# Patient Record
Sex: MALE | Race: WHITE | NOT HISPANIC OR LATINO | URBAN - METROPOLITAN AREA
[De-identification: names, ages, dates, MRNs, and addresses within clinical notes are randomized per-mention and may not be internally consistent; named-entity substitution may affect disease eponyms.]

---

## 2023-08-11 ENCOUNTER — HOSPITAL ENCOUNTER (OUTPATIENT)
Dept: DATA CONVERSION | Facility: HOSPITAL | Age: 68
Discharge: HOME | End: 2023-08-11

## 2023-08-11 DIAGNOSIS — E03.9 HYPOTHYROIDISM, UNSPECIFIED: ICD-10-CM

## 2023-08-11 LAB
T4 FREE SERPL-MCNC: 1.6 NG/DL (ref 0.9–1.7)
TSH SERPL DL<=0.05 MIU/L-ACNC: 0.24 MIU/L (ref 0.27–4.2)

## 2023-09-15 ENCOUNTER — HOSPITAL ENCOUNTER (OUTPATIENT)
Dept: DATA CONVERSION | Facility: HOSPITAL | Age: 68
Discharge: HOME | End: 2023-09-15
Payer: MEDICARE

## 2023-09-15 DIAGNOSIS — E03.9 HYPOTHYROIDISM, UNSPECIFIED: ICD-10-CM

## 2023-09-15 LAB
T4 FREE SERPL-MCNC: 1.6 NG/DL (ref 0.9–1.7)
TSH SERPL DL<=0.05 MIU/L-ACNC: 0.18 MIU/L (ref 0.27–4.2)

## 2023-11-08 PROBLEM — E05.00 GRAVES DISEASE: Status: ACTIVE | Noted: 2023-11-08

## 2023-11-08 PROBLEM — I48.0 PAROXYSMAL ATRIAL FIBRILLATION (MULTI): Status: ACTIVE | Noted: 2023-11-08

## 2023-11-08 PROBLEM — I10 ESSENTIAL HYPERTENSION: Status: ACTIVE | Noted: 2023-11-08

## 2023-11-08 PROBLEM — I82.409 DEEP VEIN THROMBOSIS (DVT) (MULTI): Status: ACTIVE | Noted: 2023-11-08

## 2023-11-08 PROBLEM — E03.9 HYPOTHYROIDISM: Status: ACTIVE | Noted: 2023-11-08

## 2023-11-08 PROBLEM — I82.462 ACUTE DEEP VEIN THROMBOSIS (DVT) OF CALF MUSCLE VEIN OF LEFT LOWER EXTREMITY (MULTI): Status: ACTIVE | Noted: 2023-11-08

## 2023-11-08 PROBLEM — N39.0 URINARY TRACT INFECTIOUS DISEASE: Status: ACTIVE | Noted: 2023-11-08

## 2023-11-08 PROBLEM — I20.9 ANGINA PECTORIS (CMS-HCC): Status: ACTIVE | Noted: 2023-11-08

## 2023-11-08 PROBLEM — R00.2 PALPITATIONS: Status: ACTIVE | Noted: 2023-11-08

## 2023-11-08 PROBLEM — G47.10 HYPERSOMNIA: Status: ACTIVE | Noted: 2023-11-08

## 2023-11-08 RX ORDER — LEVOTHYROXINE SODIUM 175 UG/1
1 TABLET ORAL DAILY
COMMUNITY
End: 2023-11-09 | Stop reason: ALTCHOICE

## 2023-11-08 RX ORDER — ROSUVASTATIN CALCIUM 20 MG/1
TABLET, COATED ORAL
COMMUNITY
Start: 2023-08-11

## 2023-11-08 RX ORDER — FLECAINIDE ACETATE 150 MG/1
1 TABLET ORAL 2 TIMES DAILY
COMMUNITY
End: 2023-11-09 | Stop reason: ALTCHOICE

## 2023-11-08 RX ORDER — LEVOTHYROXINE SODIUM 150 UG/1
1 TABLET ORAL
COMMUNITY
Start: 2023-08-11 | End: 2023-11-09 | Stop reason: ALTCHOICE

## 2023-11-08 RX ORDER — LEVOTHYROXINE SODIUM 137 UG/1
1 TABLET ORAL
COMMUNITY
Start: 2023-08-11 | End: 2023-11-16 | Stop reason: SDUPTHER

## 2023-11-08 RX ORDER — LISINOPRIL 10 MG/1
1 TABLET ORAL DAILY
COMMUNITY

## 2023-11-08 RX ORDER — APIXABAN 5 MG/1
TABLET, FILM COATED ORAL
COMMUNITY

## 2023-11-09 ENCOUNTER — OFFICE VISIT (OUTPATIENT)
Dept: PRIMARY CARE | Facility: CLINIC | Age: 68
End: 2023-11-09
Payer: MEDICARE

## 2023-11-09 VITALS
BODY MASS INDEX: 35.29 KG/M2 | SYSTOLIC BLOOD PRESSURE: 132 MMHG | WEIGHT: 305 LBS | OXYGEN SATURATION: 96 % | DIASTOLIC BLOOD PRESSURE: 76 MMHG | HEART RATE: 69 BPM | HEIGHT: 78 IN

## 2023-11-09 DIAGNOSIS — Z11.59 ENCOUNTER FOR HEPATITIS C SCREENING TEST FOR LOW RISK PATIENT: ICD-10-CM

## 2023-11-09 DIAGNOSIS — I10 ESSENTIAL HYPERTENSION: ICD-10-CM

## 2023-11-09 DIAGNOSIS — E03.9 ACQUIRED HYPOTHYROIDISM: ICD-10-CM

## 2023-11-09 DIAGNOSIS — Z00.00 MEDICARE ANNUAL WELLNESS VISIT, SUBSEQUENT: Primary | ICD-10-CM

## 2023-11-09 DIAGNOSIS — R73.01 IFG (IMPAIRED FASTING GLUCOSE): ICD-10-CM

## 2023-11-09 PROBLEM — G47.10 HYPERSOMNIA: Status: RESOLVED | Noted: 2023-11-08 | Resolved: 2023-11-09

## 2023-11-09 PROBLEM — I82.462 ACUTE DEEP VEIN THROMBOSIS (DVT) OF CALF MUSCLE VEIN OF LEFT LOWER EXTREMITY (MULTI): Status: RESOLVED | Noted: 2023-11-08 | Resolved: 2023-11-09

## 2023-11-09 PROBLEM — N39.0 URINARY TRACT INFECTIOUS DISEASE: Status: RESOLVED | Noted: 2023-11-08 | Resolved: 2023-11-09

## 2023-11-09 PROBLEM — R00.2 PALPITATIONS: Status: RESOLVED | Noted: 2023-11-08 | Resolved: 2023-11-09

## 2023-11-09 PROBLEM — I82.409 DEEP VEIN THROMBOSIS (DVT) (MULTI): Status: RESOLVED | Noted: 2023-11-08 | Resolved: 2023-11-09

## 2023-11-09 PROBLEM — I20.9 ANGINA PECTORIS (CMS-HCC): Status: RESOLVED | Noted: 2023-11-08 | Resolved: 2023-11-09

## 2023-11-09 PROCEDURE — 1036F TOBACCO NON-USER: CPT

## 2023-11-09 PROCEDURE — 1159F MED LIST DOCD IN RCRD: CPT

## 2023-11-09 PROCEDURE — 3078F DIAST BP <80 MM HG: CPT

## 2023-11-09 PROCEDURE — G0439 PPPS, SUBSEQ VISIT: HCPCS

## 2023-11-09 PROCEDURE — 90677 PCV20 VACCINE IM: CPT

## 2023-11-09 PROCEDURE — 90750 HZV VACC RECOMBINANT IM: CPT

## 2023-11-09 PROCEDURE — 3075F SYST BP GE 130 - 139MM HG: CPT

## 2023-11-09 PROCEDURE — 90472 IMMUNIZATION ADMIN EACH ADD: CPT

## 2023-11-09 PROCEDURE — 90471 IMMUNIZATION ADMIN: CPT

## 2023-11-09 PROCEDURE — G0439 PPPS, SUBSEQ VISIT: HCPCS | Mod: 25

## 2023-11-09 PROCEDURE — 1126F AMNT PAIN NOTED NONE PRSNT: CPT

## 2023-11-09 ASSESSMENT — ENCOUNTER SYMPTOMS
FATIGUE: 1
EYE PAIN: 0
DYSURIA: 0
LIGHT-HEADEDNESS: 0
HEMATURIA: 0
TREMORS: 0
BLOOD IN STOOL: 0
ARTHRALGIAS: 0
DIAPHORESIS: 0
NAUSEA: 0
FEVER: 0
COUGH: 0
PALPITATIONS: 0
DIFFICULTY URINATING: 0
DIARRHEA: 0
VOMITING: 0
SHORTNESS OF BREATH: 0
WHEEZING: 0
ADENOPATHY: 0
MYALGIAS: 0
SORE THROAT: 0

## 2023-11-09 ASSESSMENT — PATIENT HEALTH QUESTIONNAIRE - PHQ9
2. FEELING DOWN, DEPRESSED OR HOPELESS: NOT AT ALL
1. LITTLE INTEREST OR PLEASURE IN DOING THINGS: NOT AT ALL
SUM OF ALL RESPONSES TO PHQ9 QUESTIONS 1 AND 2: 0

## 2023-11-09 ASSESSMENT — PAIN SCALES - GENERAL: PAINLEVEL: 0-NO PAIN

## 2023-11-09 NOTE — PROGRESS NOTES
[facility]: FAMILY MEDICINE  MEDICARE WELLNESS EXAM      Kiel Cantrell is a 67 y.o. male that is presenting today for Annual Exam (Pt is here for a physical with no concerns /la).    Subjective   hx of HTN, anticoagulation for A-fib, hypothyroidism           Other providers: Dr. Jin (Cardiologist- sees every 6m), Dr. Dr. 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  HTN/A-fib co-managed with cardiologist, cardiologist fills medications.    HTN: Stable. On 10 mg Lisinopril. No medication side effects. Home BP not checking. Only has fatigue when in Afib. Denies chest pain, heart palpitations, SOB, dizziness, headaches, changes of vision, syncope, leg swelling.      Thyroid: currently on 137 mcg of Levothyroxine, have been decreasing medications due to low TSH. Last TSH 9/15/23 0.18 with T4 of 1.6 (decreased med from 175 to 137 mcg). Due for repeat labs. Denies cold/heat intolerance, heart palpitations, tremors, diarrhea, constipation.       Review of Systems   Constitutional:  Positive for fatigue. Negative for diaphoresis and fever.   HENT:  Positive for hearing loss. Negative for congestion and sore throat.    Eyes:  Negative for pain.   Respiratory:  Negative for cough, shortness of breath and wheezing.    Cardiovascular:  Positive for leg swelling. Negative for chest pain and palpitations.   Gastrointestinal:  Negative for blood in stool, diarrhea, nausea and vomiting.   Endocrine: Negative for cold intolerance and heat intolerance.   Genitourinary:  Negative for difficulty urinating, dysuria and hematuria.   Musculoskeletal:  Negative for arthralgias and myalgias.   Skin:  Negative for rash.   Allergic/Immunologic: Negative for immunocompromised state.   Neurological:  Negative for tremors, syncope and light-headedness.   Hematological:  Negative for adenopathy.   Psychiatric/Behavioral:  Negative for suicidal ideas.      ACTIVITIES OF DAILY LIVING:  Basic ADLs:  Problems with Bathing,  Dressing, Toileting, Transferring, Continence, Feeding No  Instrumental ADLs:  Problems with Ability to use phone, Shopping, Cooking, House-keeping, Laundry, Transportation, Medication Management, Finance Management No    Advance Care Planning   Advanced Care Planning was discussed with patient:  The patient has an active advanced care plan on file. The patient has an active surrogate decision-maker on file.    History    Past Medical History:   Diagnosis Date    Acute deep vein thrombosis (DVT) of calf muscle vein of left lower extremity (CMS/[facility]) 11/08/2023    Unspecified atrial fibrillation (CMS/[facility]) 03/27/2014    Atrial fibrillation     History reviewed. No pertinent surgical history.  Family History   Problem Relation Name Age of Onset    Lung disease Father      No Known Problems Sister      No Known Problems Daughter      No Known Problems Son       No Known Allergies  Current Outpatient Medications on File Prior to Visit   Medication Sig Dispense Refill    Eliquis 5 mg tablet as directed Orally twice a day (bid)      levothyroxine (Synthroid, Levoxyl) 137 mcg tablet Take 1 tablet (137 mcg) by mouth once daily in the morning. Take before meals.      lisinopril 10 mg tablet Take 1 tablet (10 mg) by mouth once daily.      rosuvastatin (Crestor) 20 mg tablet       [DISCONTINUED] flecainide (Tambocor) 150 mg tablet Take 1 tablet (150 mg) by mouth 2 times a day.      [DISCONTINUED] levothyroxine (Synthroid, Levoxyl) 150 mcg tablet Take 1 tablet (150 mcg) by mouth once daily in the morning. Take before meals.      [DISCONTINUED] levothyroxine (Synthroid, Levoxyl) 175 mcg tablet Take 1 tablet (175 mcg) by mouth once daily.       No current facility-administered medications on file prior to visit.     Immunization History   Administered Date(s) Administered    [facility] SARS-CoV-2 Vaccination 03/09/2021     Patient's medical history was reviewed and updated either before or during this encounter.    Objective   Vitals:     23 0745   BP: 132/76   Pulse: 69   SpO2: 96%      Physical Exam  Constitutional:       General: He is not in acute distress.     Appearance: Normal appearance.   HENT:      Right Ear: Tympanic membrane and ear canal normal.      Left Ear: Tympanic membrane and ear canal normal.      Nose: Nose normal.      Mouth/Throat:      Mouth: Mucous membranes are moist.      Pharynx: Oropharynx is clear. No oropharyngeal exudate or posterior oropharyngeal erythema.   Eyes:      Extraocular Movements: Extraocular movements intact.      Conjunctiva/sclera: Conjunctivae normal.      Pupils: Pupils are unequal.   Neck:      Thyroid: No thyroid mass or thyromegaly.      Vascular: No carotid bruit.   Cardiovascular:      Rate and Rhythm: Rhythm irregular.      Pulses:           Posterior tibial pulses are 2+ on the right side and 2+ on the left side.      Heart sounds: No murmur heard.     No gallop.   Pulmonary:      Effort: Pulmonary effort is normal.      Breath sounds: No wheezing, rhonchi or rales.   Abdominal:      General: Bowel sounds are normal.      Palpations: Abdomen is soft. There is no hepatomegaly, splenomegaly or mass.      Tenderness: There is no abdominal tenderness. There is no guarding.   Musculoskeletal:         General: Normal range of motion.      Right lower le+ Pitting Edema present.      Left lower le+ Pitting Edema present.   Lymphadenopathy:      Cervical: No cervical adenopathy.   Skin:     General: Skin is warm.      Findings: No rash.   Neurological:      General: No focal deficit present.      Mental Status: He is alert.   Psychiatric:         Mood and Affect: Mood normal.         Thought Content: Thought content normal.         Assessment/Plan    Diagnoses and all orders for this visit:  Medicare annual wellness visit, subsequent  Essential hypertension  -     Comprehensive Metabolic Panel; Future  Acquired hypothyroidism  -     TSH with reflex to Free T4 if abnormal; Future  IFG  (impaired fasting glucose)  -     Hemoglobin A1c; Future  Encounter for hepatitis C screening test for low risk patient  -     Hepatitis C Antibody; Future  Other orders  -     Pneumococcal conjugate vaccine, 20-valent (PREVNAR 20)  -     Zoster vaccine, recombinant, adult (SHINGRIX)    Patient Active Problem List   Diagnosis    Essential hypertension    Graves disease    Acquired hypothyroidism    Paroxysmal atrial fibrillation (CMS/HCC)       Diagnoses and all orders for this visit:  Medicare annual wellness visit, subsequent  Essential hypertension  -     Comprehensive Metabolic Panel; Future  Acquired hypothyroidism  -     TSH with reflex to Free T4 if abnormal; Future  IFG (impaired fasting glucose)  -     Hemoglobin A1c; Future  Encounter for hepatitis C screening test for low risk patient  -     Hepatitis C Antibody; Future  Other orders  -     Pneumococcal conjugate vaccine, 20-valent (PREVNAR 20)  -     Zoster vaccine, recombinant, adult (SHINGRIX)    The patient was encouraged to ensure that any/all documentation is accurate and up to date, and that our office be provided a copy in the event that anything changes.    Louisa Andres PA-C

## 2023-11-10 ENCOUNTER — LAB (OUTPATIENT)
Dept: LAB | Facility: LAB | Age: 68
End: 2023-11-10
Payer: MEDICARE

## 2023-11-10 DIAGNOSIS — R73.01 IFG (IMPAIRED FASTING GLUCOSE): ICD-10-CM

## 2023-11-10 DIAGNOSIS — Z11.59 ENCOUNTER FOR HEPATITIS C SCREENING TEST FOR LOW RISK PATIENT: ICD-10-CM

## 2023-11-10 DIAGNOSIS — I10 ESSENTIAL HYPERTENSION: ICD-10-CM

## 2023-11-10 DIAGNOSIS — E03.9 ACQUIRED HYPOTHYROIDISM: ICD-10-CM

## 2023-11-10 LAB
ALBUMIN SERPL BCP-MCNC: 4.1 G/DL (ref 3.4–5)
ALP SERPL-CCNC: 77 U/L (ref 33–136)
ALT SERPL W P-5'-P-CCNC: 11 U/L (ref 10–52)
ANION GAP SERPL CALC-SCNC: 10 MMOL/L (ref 10–20)
AST SERPL W P-5'-P-CCNC: 12 U/L (ref 9–39)
BILIRUB SERPL-MCNC: 0.8 MG/DL (ref 0–1.2)
BUN SERPL-MCNC: 14 MG/DL (ref 6–23)
CALCIUM SERPL-MCNC: 9.6 MG/DL (ref 8.6–10.6)
CHLORIDE SERPL-SCNC: 107 MMOL/L (ref 98–107)
CO2 SERPL-SCNC: 30 MMOL/L (ref 21–32)
CREAT SERPL-MCNC: 1.1 MG/DL (ref 0.5–1.3)
EST. AVERAGE GLUCOSE BLD GHB EST-MCNC: 120 MG/DL
GFR SERPL CREATININE-BSD FRML MDRD: 74 ML/MIN/1.73M*2
GLUCOSE SERPL-MCNC: 100 MG/DL (ref 74–99)
HBA1C MFR BLD: 5.8 %
HCV AB SER QL: NONREACTIVE
POTASSIUM SERPL-SCNC: 4.4 MMOL/L (ref 3.5–5.3)
PROT SERPL-MCNC: 6.3 G/DL (ref 6.4–8.2)
SODIUM SERPL-SCNC: 143 MMOL/L (ref 136–145)
TSH SERPL-ACNC: 0.63 MIU/L (ref 0.44–3.98)

## 2023-11-10 PROCEDURE — 36415 COLL VENOUS BLD VENIPUNCTURE: CPT

## 2023-11-10 PROCEDURE — 80053 COMPREHEN METABOLIC PANEL: CPT

## 2023-11-10 PROCEDURE — 86803 HEPATITIS C AB TEST: CPT

## 2023-11-10 PROCEDURE — 84443 ASSAY THYROID STIM HORMONE: CPT

## 2023-11-10 PROCEDURE — 83036 HEMOGLOBIN GLYCOSYLATED A1C: CPT

## 2023-11-16 DIAGNOSIS — E03.9 ACQUIRED HYPOTHYROIDISM: Primary | ICD-10-CM

## 2023-11-16 RX ORDER — LEVOTHYROXINE SODIUM 137 UG/1
137 TABLET ORAL
Qty: 90 TABLET | Refills: 3 | Status: SHIPPED | OUTPATIENT
Start: 2023-11-16

## 2024-03-13 ENCOUNTER — HOSPITAL ENCOUNTER (OUTPATIENT)
Dept: CARDIOLOGY | Facility: CLINIC | Age: 69
Discharge: HOME | End: 2024-03-13
Payer: MEDICARE

## 2024-03-13 DIAGNOSIS — I35.0 NONRHEUMATIC AORTIC (VALVE) STENOSIS: ICD-10-CM

## 2024-03-13 PROCEDURE — 93306 TTE W/DOPPLER COMPLETE: CPT

## 2024-03-13 PROCEDURE — 93306 TTE W/DOPPLER COMPLETE: CPT | Performed by: INTERNAL MEDICINE

## 2024-03-13 PROCEDURE — 2500000004 HC RX 250 GENERAL PHARMACY W/ HCPCS (ALT 636 FOR OP/ED): Performed by: STUDENT IN AN ORGANIZED HEALTH CARE EDUCATION/TRAINING PROGRAM

## 2024-03-13 RX ADMIN — PERFLUTREN 2 ML OF DILUTION: 6.52 INJECTION, SUSPENSION INTRAVENOUS at 12:28

## 2024-03-15 LAB
AORTIC VALVE MEAN GRADIENT: 27.5 MMHG
AORTIC VALVE PEAK VELOCITY: 3.34 M/S
AV PEAK GRADIENT: 44.6 MMHG
AVA (PEAK VEL): 1.1 CM2
AVA (VTI): 1.15 CM2
EJECTION FRACTION APICAL 4 CHAMBER: 58.1
EJECTION FRACTION: 73 %
LEFT ATRIUM VOLUME AREA LENGTH INDEX BSA: 35.5 ML/M2
LEFT VENTRICLE INTERNAL DIMENSION DIASTOLE: 5.25 CM (ref 3.5–6)
LEFT VENTRICULAR OUTFLOW TRACT DIAMETER: 2.11 CM
RIGHT VENTRICLE FREE WALL PEAK S': 11 CM/S
RIGHT VENTRICLE PEAK SYSTOLIC PRESSURE: 28.2 MMHG
TRICUSPID ANNULAR PLANE SYSTOLIC EXCURSION: 1.9 CM

## 2024-03-29 ENCOUNTER — HOSPITAL ENCOUNTER (OUTPATIENT)
Dept: VASCULAR MEDICINE | Facility: CLINIC | Age: 69
Discharge: HOME | End: 2024-03-29
Payer: MEDICARE

## 2024-03-29 DIAGNOSIS — I10 ESSENTIAL HYPERTENSION: ICD-10-CM

## 2024-03-29 DIAGNOSIS — I87.1 COMPRESSION OF VEIN: ICD-10-CM

## 2024-03-29 DIAGNOSIS — I87.8: ICD-10-CM

## 2024-03-29 PROCEDURE — 93978 VASCULAR STUDY: CPT

## 2024-03-29 PROCEDURE — 93978 VASCULAR STUDY: CPT | Performed by: INTERNAL MEDICINE

## 2024-10-04 DIAGNOSIS — E03.9 ACQUIRED HYPOTHYROIDISM: ICD-10-CM

## 2024-10-07 RX ORDER — LEVOTHYROXINE SODIUM 137 UG/1
137 TABLET ORAL
Qty: 90 TABLET | Refills: 0 | Status: SHIPPED | OUTPATIENT
Start: 2024-10-07

## 2024-11-14 ENCOUNTER — OFFICE VISIT (OUTPATIENT)
Dept: PRIMARY CARE | Facility: CLINIC | Age: 69
End: 2024-11-14
Payer: MEDICARE

## 2024-11-14 VITALS
HEART RATE: 88 BPM | WEIGHT: 315 LBS | SYSTOLIC BLOOD PRESSURE: 132 MMHG | HEIGHT: 78 IN | BODY MASS INDEX: 36.45 KG/M2 | OXYGEN SATURATION: 99 % | DIASTOLIC BLOOD PRESSURE: 70 MMHG

## 2024-11-14 DIAGNOSIS — I48.0 PAROXYSMAL ATRIAL FIBRILLATION (MULTI): ICD-10-CM

## 2024-11-14 DIAGNOSIS — Z12.12 SCREENING FOR COLORECTAL CANCER: ICD-10-CM

## 2024-11-14 DIAGNOSIS — Z00.00 ROUTINE GENERAL MEDICAL EXAMINATION AT HEALTH CARE FACILITY: Primary | ICD-10-CM

## 2024-11-14 DIAGNOSIS — I10 ESSENTIAL HYPERTENSION: ICD-10-CM

## 2024-11-14 DIAGNOSIS — I25.10 CORONARY ARTERY DISEASE INVOLVING NATIVE CORONARY ARTERY OF NATIVE HEART WITHOUT ANGINA PECTORIS: ICD-10-CM

## 2024-11-14 DIAGNOSIS — E03.9 ACQUIRED HYPOTHYROIDISM: ICD-10-CM

## 2024-11-14 DIAGNOSIS — N52.9 ERECTILE DYSFUNCTION, UNSPECIFIED ERECTILE DYSFUNCTION TYPE: ICD-10-CM

## 2024-11-14 DIAGNOSIS — R73.03 PRE-DIABETES: ICD-10-CM

## 2024-11-14 DIAGNOSIS — Z12.11 SCREENING FOR COLORECTAL CANCER: ICD-10-CM

## 2024-11-14 DIAGNOSIS — B35.6 TINEA CRURIS: ICD-10-CM

## 2024-11-14 PROCEDURE — 1123F ACP DISCUSS/DSCN MKR DOCD: CPT

## 2024-11-14 PROCEDURE — 99214 OFFICE O/P EST MOD 30 MIN: CPT

## 2024-11-14 PROCEDURE — 1159F MED LIST DOCD IN RCRD: CPT

## 2024-11-14 PROCEDURE — 1158F ADVNC CARE PLAN TLK DOCD: CPT

## 2024-11-14 PROCEDURE — 3075F SYST BP GE 130 - 139MM HG: CPT

## 2024-11-14 PROCEDURE — 90662 IIV NO PRSV INCREASED AG IM: CPT

## 2024-11-14 PROCEDURE — 1126F AMNT PAIN NOTED NONE PRSNT: CPT

## 2024-11-14 PROCEDURE — 3008F BODY MASS INDEX DOCD: CPT

## 2024-11-14 PROCEDURE — 1036F TOBACCO NON-USER: CPT

## 2024-11-14 PROCEDURE — G0439 PPPS, SUBSEQ VISIT: HCPCS

## 2024-11-14 PROCEDURE — 99215 OFFICE O/P EST HI 40 MIN: CPT

## 2024-11-14 PROCEDURE — 3078F DIAST BP <80 MM HG: CPT

## 2024-11-14 RX ORDER — SILDENAFIL 50 MG/1
50 TABLET, FILM COATED ORAL DAILY PRN
Qty: 12 TABLET | Refills: 3 | Status: SHIPPED | OUTPATIENT
Start: 2024-11-14 | End: 2025-11-14

## 2024-11-14 RX ORDER — CLOTRIMAZOLE 1 %
CREAM (GRAM) TOPICAL 2 TIMES DAILY
Qty: 30 G | Refills: 0 | Status: SHIPPED | OUTPATIENT
Start: 2024-11-14 | End: 2025-03-14

## 2024-11-14 ASSESSMENT — PAIN SCALES - GENERAL: PAINLEVEL_OUTOF10: 0-NO PAIN

## 2024-11-14 ASSESSMENT — COGNITIVE AND FUNCTIONAL STATUS - GENERAL: VERBAL FLUENCY - ANIMAL NAMES (0 TO 25): 3

## 2024-11-14 ASSESSMENT — ENCOUNTER SYMPTOMS
ADENOPATHY: 0
LIGHT-HEADEDNESS: 0
NAUSEA: 0
DIFFICULTY URINATING: 0
ARTHRALGIAS: 1
PALPITATIONS: 0
COUGH: 0
HEMATURIA: 0
DYSURIA: 0
DIAPHORESIS: 0
SHORTNESS OF BREATH: 0
WHEEZING: 0
MYALGIAS: 0
BLOOD IN STOOL: 0
DIARRHEA: 0
FATIGUE: 0
FEVER: 0
VOMITING: 0
SORE THROAT: 0

## 2024-11-14 NOTE — PROGRESS NOTES
[facility]: FAMILY MEDICINE  MEDICARE WELLNESS EXAM      Kiel Cantrell is a 68 y.o. male that is presenting today for Annual Exam (Patient is having itching around the scrotum/dd).    Subjective   hx of HTN, CAD, anticoagulation for A-fib, hypothyroidism           Other providers: Dr. Jin (Cardiologist- sees every 6m), Dr. Dr. 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    HTN/A-fib co-managed with cardiologist, cardiologist fills medications. Recently stopped flecainide as was not working and now just on Eliquis.    HTN: Stable. On 10 mg Lisinopril. No medication side effects. Home BP not checking. Only has fatigue when in Afib. Denies chest pain, heart palpitations, SOB, dizziness, headaches, changes of vision, syncope, leg swelling.      Thyroid: controlled on 137 mcg of Levothyroxine. Due for TSH. Feeling euthyroid.     Acute concerns:  1) Scrotum pruritus waxing and waning x 3-4 months. Patient then tried changing laundry detergent and change body lotion which has not helped. Patient making sure to keep area dry. Has tried vaseline and neosporin as well.    2) Cardiologist gave him Viagra, patient states works slightly but also would like a urologist referral and refill.             Denies changes or concerns with hearing.   Review of Systems   Constitutional:  Negative for diaphoresis, fatigue and fever.   HENT:  Negative for congestion, hearing loss and sore throat.    Eyes:  Negative for visual disturbance.   Respiratory:  Negative for cough, shortness of breath and wheezing.    Cardiovascular:  Negative for chest pain, palpitations and leg swelling.   Gastrointestinal:  Negative for blood in stool, diarrhea, nausea and vomiting.   Genitourinary:  Negative for difficulty urinating, dysuria and hematuria.   Musculoskeletal:  Positive for arthralgias. Negative for myalgias.   Skin:  Positive for rash.   Allergic/Immunologic: Negative for immunocompromised state.   Neurological:   Negative for syncope and light-headedness.   Hematological:  Negative for adenopathy.   Psychiatric/Behavioral:  Negative for suicidal ideas.      ACTIVITIES OF DAILY LIVING:  Basic ADLs:  Problems with Bathing, Dressing, Toileting, Transferring, Continence, Feeding No  Instrumental ADLs:  Problems with Ability to use phone, Shopping, Cooking, House-keeping, Laundry, Transportation, Medication Management, Finance Management No    Advance Care Planning   Advanced Care Planning was discussed with patient:  The patient has an active advanced care plan on file. The patient has an active surrogate decision-maker on file.    History    Past Medical History:   Diagnosis Date    Acute deep vein thrombosis (DVT) of calf muscle vein of left lower extremity (Multi) 11/08/2023    HL (hearing loss)     Unspecified atrial fibrillation (Multi) 03/27/2014    Atrial fibrillation    Visual impairment      Past Surgical History:   Procedure Laterality Date    EYE SURGERY      VASECTOMY       Family History   Problem Relation Name Age of Onset    Early natural death Mother Nara Cantrell     Miscarriages / Stillbirths Mother Nara Cantrell     Lung disease Father Nolan Cantrell     COPD Father Nolan Cantrell     No Known Problems Sister      No Known Problems Daughter      No Known Problems Son       No Known Allergies  Current Outpatient Medications on File Prior to Visit   Medication Sig Dispense Refill    Eliquis 5 mg tablet as directed Orally twice a day (bid)      levothyroxine (Synthroid) 137 mcg tablet TAKE 1 TABLET DAILY IN THE MORNING BEFORE A MEAL 90 tablet 0    lisinopril 10 mg tablet Take 1 tablet (10 mg) by mouth once daily.      rosuvastatin (Crestor) 20 mg tablet        No current facility-administered medications on file prior to visit.     Immunization History   Administered Date(s) Administered    [facility] SARS-CoV-2 Vaccination 03/09/2021    Pneumococcal conjugate vaccine, 20-valent (PREVNAR 20) 11/09/2023    Zoster vaccine,  recombinant, adult (SHINGRIX) 11/09/2023     Patient's medical history was reviewed and updated either before or during this encounter.    Objective   Vitals:    11/14/24 0857   BP: 132/70   Pulse: 88   SpO2: 99%      Physical Exam  Constitutional:       General: He is not in acute distress.     Appearance: Normal appearance.   HENT:      Right Ear: Tympanic membrane and ear canal normal.      Left Ear: Tympanic membrane and ear canal normal.      Nose: Nose normal.      Mouth/Throat:      Mouth: Mucous membranes are moist.      Pharynx: Oropharynx is clear. No oropharyngeal exudate or posterior oropharyngeal erythema.   Eyes:      Extraocular Movements: Extraocular movements intact.      Conjunctiva/sclera: Conjunctivae normal.      Pupils: Pupils are equal, round, and reactive to light.   Neck:      Thyroid: No thyroid mass or thyromegaly.   Cardiovascular:      Rate and Rhythm: Normal rate and regular rhythm.      Pulses: Normal pulses.      Heart sounds: No murmur heard.     No gallop.   Pulmonary:      Effort: Pulmonary effort is normal.      Breath sounds: No wheezing, rhonchi or rales.   Abdominal:      General: Bowel sounds are normal.      Palpations: Abdomen is soft. There is no hepatomegaly, splenomegaly or mass.      Tenderness: There is no abdominal tenderness. There is no guarding.   Genitourinary:         Comments: Slightly erythematous rash of inner groin area  Musculoskeletal:         General: Normal range of motion.      Right lower leg: No edema.      Left lower leg: No edema.   Lymphadenopathy:      Cervical: No cervical adenopathy.   Skin:     General: Skin is warm.      Findings: Rash present.   Neurological:      General: No focal deficit present.      Mental Status: He is alert.      Motor: Motor function is intact. No weakness.   Psychiatric:         Mood and Affect: Mood normal.         Thought Content: Thought content normal.         Assessment/Plan    Diagnoses and all orders for this  visit:  Routine general medical examination at health care facility  -     1 Year Follow Up In Primary Care - Wellness Exam; Future  Essential hypertension  -     Comprehensive metabolic panel; Future  Acquired hypothyroidism  -     Tsh With Reflex To Free T4 If Abnormal; Future  Paroxysmal atrial fibrillation (Multi)  Coronary artery disease involving native coronary artery of native heart without angina pectoris  -     Lipid panel; Future  Screening for colorectal cancer  -     Cologuard®; Future  Pre-diabetes  -     Hemoglobin A1c; Future  Tinea cruris  -     clotrimazole (Lotrimin) 1 % cream; Apply topically 2 times a day. apply to affected area  Erectile dysfunction, unspecified erectile dysfunction type  -     sildenafil (Viagra) 50 mg tablet; Take 1 tablet (50 mg) by mouth once daily as needed for erectile dysfunction.  -     Referral to Urology; Future  Other orders  -     Flu vaccine, trivalent, preservative free, HIGH-DOSE, age 65y+ (Fluzone)    Given clotrimazole for groin rash. Will fill thyroid meds after labs come back. Follow-up one year or sooner prn. Get shingles shot at pharmacy.     Patient Active Problem List   Diagnosis    Essential hypertension    Graves disease    Acquired hypothyroidism    Paroxysmal atrial fibrillation (Multi)    Coronary artery disease       The patient was encouraged to ensure that any/all documentation is accurate and up to date, and that our office be provided a copy in the event that anything changes.    Louisa Andres PA-C

## 2024-11-15 ENCOUNTER — LAB (OUTPATIENT)
Dept: LAB | Facility: LAB | Age: 69
End: 2024-11-15
Payer: MEDICARE

## 2024-11-15 ENCOUNTER — TELEPHONE (OUTPATIENT)
Dept: PRIMARY CARE | Facility: CLINIC | Age: 69
End: 2024-11-15

## 2024-11-15 DIAGNOSIS — E03.9 ACQUIRED HYPOTHYROIDISM: ICD-10-CM

## 2024-11-15 DIAGNOSIS — I10 ESSENTIAL HYPERTENSION: ICD-10-CM

## 2024-11-15 DIAGNOSIS — R73.03 PRE-DIABETES: ICD-10-CM

## 2024-11-15 DIAGNOSIS — I25.10 CORONARY ARTERY DISEASE INVOLVING NATIVE CORONARY ARTERY OF NATIVE HEART WITHOUT ANGINA PECTORIS: ICD-10-CM

## 2024-11-15 LAB
ALBUMIN SERPL BCP-MCNC: 4.1 G/DL (ref 3.4–5)
ALP SERPL-CCNC: 63 U/L (ref 33–136)
ALT SERPL W P-5'-P-CCNC: 17 U/L (ref 10–52)
ANION GAP SERPL CALC-SCNC: 13 MMOL/L (ref 10–20)
AST SERPL W P-5'-P-CCNC: 14 U/L (ref 9–39)
BILIRUB SERPL-MCNC: 0.6 MG/DL (ref 0–1.2)
BUN SERPL-MCNC: 18 MG/DL (ref 6–23)
CALCIUM SERPL-MCNC: 8.7 MG/DL (ref 8.6–10.6)
CHLORIDE SERPL-SCNC: 109 MMOL/L (ref 98–107)
CHOLEST SERPL-MCNC: 83 MG/DL (ref 0–199)
CHOLESTEROL/HDL RATIO: 2.5
CO2 SERPL-SCNC: 24 MMOL/L (ref 21–32)
CREAT SERPL-MCNC: 1.08 MG/DL (ref 0.5–1.3)
EGFRCR SERPLBLD CKD-EPI 2021: 75 ML/MIN/1.73M*2
EST. AVERAGE GLUCOSE BLD GHB EST-MCNC: 134 MG/DL
GLUCOSE SERPL-MCNC: 116 MG/DL (ref 74–99)
HBA1C MFR BLD: 6.3 %
HDLC SERPL-MCNC: 33.4 MG/DL
LDLC SERPL CALC-MCNC: 31 MG/DL
NON HDL CHOLESTEROL: 50 MG/DL (ref 0–149)
POTASSIUM SERPL-SCNC: 4.3 MMOL/L (ref 3.5–5.3)
PROT SERPL-MCNC: 6.3 G/DL (ref 6.4–8.2)
SODIUM SERPL-SCNC: 142 MMOL/L (ref 136–145)
TRIGL SERPL-MCNC: 94 MG/DL (ref 0–149)
TSH SERPL-ACNC: 0.59 MIU/L (ref 0.44–3.98)
VLDL: 19 MG/DL (ref 0–40)

## 2024-11-15 PROCEDURE — 80061 LIPID PANEL: CPT

## 2024-11-15 PROCEDURE — 83036 HEMOGLOBIN GLYCOSYLATED A1C: CPT

## 2024-11-15 PROCEDURE — 80053 COMPREHEN METABOLIC PANEL: CPT

## 2024-11-15 PROCEDURE — 36415 COLL VENOUS BLD VENIPUNCTURE: CPT

## 2024-11-15 PROCEDURE — 84443 ASSAY THYROID STIM HORMONE: CPT

## 2024-11-15 RX ORDER — LEVOTHYROXINE SODIUM 137 UG/1
137 TABLET ORAL
Qty: 90 TABLET | Refills: 3 | Status: SHIPPED | OUTPATIENT
Start: 2024-11-15

## 2024-11-15 NOTE — TELEPHONE ENCOUNTER
Thyroid stable and thyroid medication refill sent. A1c at 6.3 has elevated compared to previous years, needs to really focus on healthy diet with eliminating sugars and carbohydrates to prevent progression to DM. Rest of labs normal.

## 2024-12-05 LAB — NONINV COLON CA DNA+OCC BLD SCRN STL QL: NEGATIVE

## 2024-12-06 DIAGNOSIS — B35.6 TINEA CRURIS: ICD-10-CM

## 2024-12-07 RX ORDER — CLOTRIMAZOLE 1 %
CREAM (GRAM) TOPICAL 2 TIMES DAILY
Qty: 30 G | Refills: 1 | Status: SHIPPED | OUTPATIENT
Start: 2024-12-07 | End: 2025-04-06

## 2024-12-09 ENCOUNTER — OFFICE VISIT (OUTPATIENT)
Dept: SPORTS MEDICINE | Facility: CLINIC | Age: 69
End: 2024-12-09
Payer: MEDICARE

## 2024-12-09 VITALS
HEART RATE: 72 BPM | HEIGHT: 78 IN | DIASTOLIC BLOOD PRESSURE: 74 MMHG | WEIGHT: 305 LBS | BODY MASS INDEX: 35.29 KG/M2 | SYSTOLIC BLOOD PRESSURE: 122 MMHG

## 2024-12-09 DIAGNOSIS — S99.911A RIGHT ANKLE INJURY, INITIAL ENCOUNTER: ICD-10-CM

## 2024-12-09 DIAGNOSIS — S82.51XA AVULSION FRACTURE OF MEDIAL MALLEOLUS OF RIGHT TIBIA, CLOSED, INITIAL ENCOUNTER: ICD-10-CM

## 2024-12-09 DIAGNOSIS — S82.446A: ICD-10-CM

## 2024-12-09 DIAGNOSIS — S93.491A HIGH ANKLE SPRAIN OF RIGHT LOWER EXTREMITY, INITIAL ENCOUNTER: ICD-10-CM

## 2024-12-09 DIAGNOSIS — S93.401A SPRAIN OF RIGHT ANKLE, INITIAL ENCOUNTER: ICD-10-CM

## 2024-12-09 PROCEDURE — 99214 OFFICE O/P EST MOD 30 MIN: CPT | Performed by: NURSE PRACTITIONER

## 2024-12-09 PROCEDURE — 1125F AMNT PAIN NOTED PAIN PRSNT: CPT | Performed by: NURSE PRACTITIONER

## 2024-12-09 PROCEDURE — 3008F BODY MASS INDEX DOCD: CPT | Performed by: NURSE PRACTITIONER

## 2024-12-09 PROCEDURE — 3074F SYST BP LT 130 MM HG: CPT | Performed by: NURSE PRACTITIONER

## 2024-12-09 PROCEDURE — 3078F DIAST BP <80 MM HG: CPT | Performed by: NURSE PRACTITIONER

## 2024-12-09 PROCEDURE — 1123F ACP DISCUSS/DSCN MKR DOCD: CPT | Performed by: NURSE PRACTITIONER

## 2024-12-09 PROCEDURE — 1159F MED LIST DOCD IN RCRD: CPT | Performed by: NURSE PRACTITIONER

## 2024-12-09 PROCEDURE — 99204 OFFICE O/P NEW MOD 45 MIN: CPT | Performed by: NURSE PRACTITIONER

## 2024-12-09 ASSESSMENT — PAIN - FUNCTIONAL ASSESSMENT: PAIN_FUNCTIONAL_ASSESSMENT: 0-10

## 2024-12-09 ASSESSMENT — COLUMBIA-SUICIDE SEVERITY RATING SCALE - C-SSRS
1. IN THE PAST MONTH, HAVE YOU WISHED YOU WERE DEAD OR WISHED YOU COULD GO TO SLEEP AND NOT WAKE UP?: NO
2. HAVE YOU ACTUALLY HAD ANY THOUGHTS OF KILLING YOURSELF?: NO
6. HAVE YOU EVER DONE ANYTHING, STARTED TO DO ANYTHING, OR PREPARED TO DO ANYTHING TO END YOUR LIFE?: NO

## 2024-12-09 ASSESSMENT — ENCOUNTER SYMPTOMS
OCCASIONAL FEELINGS OF UNSTEADINESS: 1
DEPRESSION: 0
LOSS OF SENSATION IN FEET: 0

## 2024-12-09 ASSESSMENT — PATIENT HEALTH QUESTIONNAIRE - PHQ9
1. LITTLE INTEREST OR PLEASURE IN DOING THINGS: NOT AT ALL
2. FEELING DOWN, DEPRESSED OR HOPELESS: NOT AT ALL
SUM OF ALL RESPONSES TO PHQ9 QUESTIONS 1 AND 2: 0

## 2024-12-09 ASSESSMENT — PAIN SCALES - GENERAL
PAINLEVEL_OUTOF10: 3
PAINLEVEL_OUTOF10: 3

## 2024-12-09 ASSESSMENT — PAIN DESCRIPTION - DESCRIPTORS: DESCRIPTORS: DULL;THROBBING

## 2024-12-09 NOTE — PATIENT INSTRUCTIONS
May use PRICE therapy as needed.  Possibility in future start into Physical Therapy 1-2 times a week for 8-10 weeks with manual therapy as well as dry needling and IASTM  Stressed the importance of wearing shoes with good stability control to help with the biomechanics affecting the lower legs  Stressed the importance of wearing full foot insoles to help with the biomechanics affecting the lower legs  Recommendation over-the-counter calcium 500mg, 3 times a day with vitamin-D3 5320-7708+ units a day with food as well as a daily multivitamin  Recommendation over-the-counter Move Free for joint health  May take OTC Tylenol Extra Strength or OTC Tylenol Arthritis, taking one every 6-8 hours with food as needed for pain management.  Patient advised regarding the risk and/or potential adverse reactions and/or side effects of any prescribed medications along with any over-the-counter medications or any supplements used. Patient advised to seek immediate medical care if any adverse reactions occur. The patient and/or patient(s) parent(s) verbalized their understanding.  Discussed in detail with the patient to the level of their understanding the possibility in the future of regenerative injections versus corticosteroids injections  MRI of RIGHT ANKLE to rule out tendon vs ligament vs tear vs fracture vs other, MSK to read.   Follow up after MRI of RIGHT ANKLE or sooner if needed.     You have been ordered an MRI/MR Arthrogram of the RIGHT ANKLE. Once you contact scheduling at [phone] and obtain the date and time of your MRI/MR Arthrogram, contact our office at [phone] to schedule your follow-up appointment to review your results.

## 2024-12-09 NOTE — PROGRESS NOTES
"New patient  History Of Present Illness  Kiel Cantrell is a 69 y.o. male presenting with RIGHT ankle pain. Patient reports he fell on ice in the driveway last Tuesday and the R ankle was \"locked in place\" and could not move, and was forced in to inversion. Patient stated his R knee hurt more than his ankle at first, but he first noticed the ankle pain when he stood on it when he got up. Patient relays he attempted to move his R ankle to relieve the pain as he had with previous ankle injuries noting it did not decrease.  Patient went to an urgent care location 12/8/24, had X-rays, and was provided a walking boot. Patient has been using crutches he had at home for a past knee surgery. Patient came into the appointment without the walking boot on because it is too painful due to swelling. Patient reports 3/10 at rest, noting it was 8/10 at the time of the injury. Patient states the pain has been gradually improving, but still present with weight bearing.  We reviewed patient x-ray results in detail.  Patient x-rays show a spiral fracture of the distal fibula as well as significant swelling.  Upon exam patient has indications of a high ankle sprain particularly over the syndesmotic ligaments as well as medial and lateral ankle sprain and marked tenderness over the talus with concern for occult fracture.  As such after discussion due to patient's physical exam and due to the nature of the injury we will order an MRI to evaluate for underlying soft tissue injury and concern for need for referral to orthopedic surgery for syndesmotic repair.  We will have patient continue with his walking boot and will be nonweightbearing in a pair of crutches.  Patient is to not drive.  We can follow-up after MRI and adjust treatment as indicated at that time.  Patient and spouse verbalized understanding and agreement with plan of care.    Past Medical History  He has a past medical history of Acute deep vein thrombosis (DVT) of calf " "muscle vein of left lower extremity (Multi) (11/08/2023), HL (hearing loss), Unspecified atrial fibrillation (Multi) (03/27/2014), and Visual impairment.    Surgical History  He has a past surgical history that includes Vasectomy and Eye surgery.     Social History  He reports that he has never smoked. He has never been exposed to tobacco smoke. He has never used smokeless tobacco. He reports current alcohol use of about 3.0 standard drinks of alcohol per week. He reports that he does not use drugs.    Family History  Family History   Problem Relation Name Age of Onset    Early natural death Mother Nara Cantrell     Miscarriages / Stillbirths Mother Nara Cantrell     Lung disease Father Nolan Cantrell     COPD Father Nolan Cantrell     No Known Problems Sister      No Known Problems Daughter      No Known Problems Son          Allergies  Patient has no known allergies.    Review of Systems  Review of Systems     Last Recorded Vitals  /74 (BP Location: Left arm, Patient Position: Sitting, BP Cuff Size: Large adult)   Pulse 72   Ht 1.981 m (6' 6\")   Wt 138 kg (305 lb)   BMI 35.25 kg/m²      The , LON FERNANDEZ,  was present during today's visit and not limited to physical examination!    Examination:  Right Ankle and Foot  Edema: Positive.   Ecchymosis/Bruising: Positive.   Percussion Test: Positive           Tuning Fork Test: Positive    Orientation:   Positive  Asymmetrical, because of RIGHT ankle pain and swelling.          ROM:   Positive, Decreased  plantarflexion and dorsiflexion due to pain            Muscle Strength: Positive   +3/+5 Planar Flexion, Decreased due to pain  +3/+5  Dorsi Flexion, Decreased due to pain     +3/+5 Inversion  +3/+5 Eversion        +3/+5 Plantarflexion in combination with inversion  +3/+5 Plantarflexion in combination with eversion          +3/+5 Dorsiflexion in combination with inversion (Posterior Tibialis)  +3/+5 Dorsiflexion in combination with eversion (Peroneal " Brevis)  +3/+5 Dorsiflexion in combination with eversion and flexion of great toe (Peroneal Longus).            Palpation:   Positive, Painful and Tender to Palpation Right medial malleolus, tibia/fibula           Vascular:   +2/+4 Dorsalis Pedis  +2/+4 Posterior Tibialis  Capillary Refill < 2 Seconds.        Leg/Ankle/Foot - Ankle Impingement:  Posterior Ankle Impingement Test: Negative.   Anterior Ankle Impingement Test: Negative.         Leg/Ankle/Foot - Ankle Instability:  Flexibility Test:  Positive.   Anterior Drawer Test:  Negative.   Talar Tilt Test:  Negative.   External Rotation Kleiger Plantar Flexion Test: Positive  External Rotation Kleiger Dorsiflexion Test:  Positive  Squeeze Test:  Positive  Dorsiflexion Test:  Negative.   Posterior Tibial Instability Test: Negative.  Peroneal Tendon Instability Test:  Negative.  Horizontal Squeeze Test:  Positive.   Vertical Squeeze Test:  Positive.   Standing/Walking Test:  Positive, Painful.         Leg/Ankle/Foot - DVT:  Maikol's Sign: Negative.         Leg/Ankle/Foot - Forefoot:  Strunsky Test:  Negative.   Gaenslen Maneuver Test: Negative.   Metatarsal Tap Test: Negative  Crepitation Test: Negative.         Leg/Ankle/Foot - Hindfoot Achilles/Calcaneus:  Salazar Compression Test: Negative.   Hoffa Sign: Negative.   Achilles Tendon Tap Test: Negative.   Heel Compression Test: Negative.         Leg/Ankle/Foot - Nerve Irritation:  Sahara Sign: Negative.   Digital Nerve Stretch Test: Negative.   Tinel Sign: Negative.   Tourniquet Sign: Negative.         Feet/Foot:   Positive BILATERAL  Valgus foot     Imaging and Diagnostics Review:  Plain radiographs ordered during today's visit and independently reviewed.  Patient has spiral fracture of the distal fibula with marked swelling  Assessment   1. Right ankle injury, initial encounter    2. Sprain of right ankle, initial encounter    3. Stress fracture of right fibula, initial encounter    4. Avulsion fracture of medial  malleolus of right tibia, closed, initial encounter      Plan   Treatment or Intervention:  May use PRICE therapy as needed.  Possibility in future start into Physical Therapy 1-2 times a week for 8-10 weeks with manual therapy as well as dry needling and IASTM  Stressed the importance of wearing shoes with good stability control to help with the biomechanics affecting the lower legs  Stressed the importance of wearing full foot insoles to help with the biomechanics affecting the lower legs  Recommendation over-the-counter calcium 500mg, 3 times a day with vitamin-D3 7906-5355+ units a day with food as well as a daily multivitamin  Recommendation over-the-counter Move Free for joint health  May take OTC Tylenol Extra Strength or OTC Tylenol Arthritis, taking one every 6-8 hours with food as needed for pain management.  Patient advised regarding the risk and/or potential adverse reactions and/or side effects of any prescribed medications along with any over-the-counter medications or any supplements used. Patient advised to seek immediate medical care if any adverse reactions occur. The patient and/or patient(s) parent(s) verbalized their understanding.  Discussed in detail with the patient to the level of their understanding the possibility in the future of regenerative injections versus corticosteroids injections  MRI of RIGHT ANKLE to rule out tendon vs ligament vs tear vs fracture vs other, MSK to read.   Follow up after MRI of RIGHT ANKLE or sooner if needed.     Diagnostic studies:      Activity Instructions, Restrictions, and Accommodations:      Consultations/Referrals:  Physical therapy    Follow-up:  Follow up after MRI  Total appointment time _45_ minutes. Greater than 50% spent counseling patient on results of physical exam, treatment options as well as results of ordered imaging and treatment for results, need for PT and expected outcomes, as well as discussing possible medications.    Deven Chu  CNP

## 2024-12-19 ENCOUNTER — HOSPITAL ENCOUNTER (OUTPATIENT)
Dept: RADIOLOGY | Facility: CLINIC | Age: 69
Discharge: HOME | End: 2024-12-19
Payer: MEDICARE

## 2024-12-19 DIAGNOSIS — S93.491A HIGH ANKLE SPRAIN OF RIGHT LOWER EXTREMITY, INITIAL ENCOUNTER: ICD-10-CM

## 2024-12-19 DIAGNOSIS — S82.446A: ICD-10-CM

## 2024-12-19 DIAGNOSIS — S99.911A RIGHT ANKLE INJURY, INITIAL ENCOUNTER: ICD-10-CM

## 2024-12-19 DIAGNOSIS — S82.51XA AVULSION FRACTURE OF MEDIAL MALLEOLUS OF RIGHT TIBIA, CLOSED, INITIAL ENCOUNTER: ICD-10-CM

## 2024-12-19 DIAGNOSIS — S93.401A SPRAIN OF RIGHT ANKLE, INITIAL ENCOUNTER: ICD-10-CM

## 2024-12-19 PROCEDURE — 73721 MRI JNT OF LWR EXTRE W/O DYE: CPT | Mod: RIGHT SIDE | Performed by: RADIOLOGY

## 2024-12-19 PROCEDURE — 73721 MRI JNT OF LWR EXTRE W/O DYE: CPT | Mod: RT

## 2024-12-20 ASSESSMENT — ENCOUNTER SYMPTOMS
CARDIOVASCULAR NEGATIVE: 1
MYALGIAS: 1
CONSTITUTIONAL NEGATIVE: 1
RESPIRATORY NEGATIVE: 1
ARTHRALGIAS: 1

## 2024-12-20 NOTE — PATIENT INSTRUCTIONS
May use PRICE therapy as needed.  Possibility in future start into Physical Therapy 1-2 times a week for 8-10 weeks with manual therapy as well as dry needling and IASTM  Again stressed the importance of wearing shoes with good stability control to help with the biomechanics affecting the lower legs  Again stressed the importance of wearing full foot insoles to help with the biomechanics affecting the lower legs  Recommendation over-the-counter calcium 500mg, 3 times a day with vitamin-D3 6117-8151+ units a day with food as well as a daily multivitamin  Recommendation over-the-counter Move Free for joint health  May take OTC Tylenol Extra Strength or OTC Tylenol Arthritis, taking one every 6-8 hours with food as needed for pain management.  Patient advised regarding the risk and/or potential adverse reactions and/or side effects of any prescribed medications along with any over-the-counter medications or any supplements used. Patient advised to seek immediate medical care if any adverse reactions occur. The patient and/or patient(s) parent(s) verbalized their understanding.  Discussed in detail with the patient to the level of their understanding the possibility in the future of regenerative injections versus corticosteroids injections  Reviewed RIGHT ANKLE MRI in detail with the patient and/or patients parent/legal guardian to their level of understanding; a copy of these results were provided to the patient and/or patients parent/legal guardian at the time of this office visit.   Continue to use crutches nonweight bearing   Follow up 3 WEEKS SAM

## 2024-12-20 NOTE — PROGRESS NOTES
"Established patient  History Of Present Illness  Kiel Cantrell is a 69 y.o. male presenting for his MRI follow up of his RIGHT ankle. Since last visit in office, patient states that he feels improved. Presents using crutches and wearing tall walking boot as previously instructed. Rates current pain as a 1/10 that is \"every so often\".  We reviewed patient MRI results in detail.  Patient and spouse verbalized understanding of results.  We discussed treatment options and will have patient continue with walking boot and crutches as previously prescribed as well as physical therapy.  Patient will also continue with calcium and vitamin D as previously recommended.  We can reevaluate in 4 weeks for mery-ray and adjust treatment as indicated at that time.  Patient does not warrant surgery at this time but we will reevaluate at a future appointment and if patient continues to have significant pain after completing therapy and complete resolution of his fracture we can consider referral to surgery for reevaluation.  Patient and spouse verbalized understanding and agreement with plan of care.    Past Medical History  He has a past medical history of Acute deep vein thrombosis (DVT) of calf muscle vein of left lower extremity (Multi) (11/08/2023), HL (hearing loss), Unspecified atrial fibrillation (Multi) (03/27/2014), and Visual impairment.    Surgical History  He has a past surgical history that includes Vasectomy and Eye surgery.     Social History  He reports that he has never smoked. He has never been exposed to tobacco smoke. He has never used smokeless tobacco. He reports current alcohol use of about 3.0 standard drinks of alcohol per week. He reports that he does not use drugs.    Family History  Family History   Problem Relation Name Age of Onset    Early natural death Mother Nara Cantrell     Miscarriages / Stillbirths Mother Nara Cantrell     Lung disease Father Nolan Cantrell     COPD Father Nolan Cantrell     No Known " "Problems Sister      No Known Problems Daughter      No Known Problems Son       Allergies  Patient has no known allergies.    Review of Systems  Review of Systems   Constitutional: Negative.    Respiratory: Negative.     Cardiovascular: Negative.    Musculoskeletal:  Positive for arthralgias and myalgias.   All other systems reviewed and are negative.       Last Recorded Vitals  /76 (BP Location: Right arm, Patient Position: Sitting, BP Cuff Size: Large adult)   Pulse 81   Ht 1.981 m (6' 6\")   Wt 138 kg (305 lb)   BMI 35.25 kg/m²      The , YARI NAJERA, was present during today's visit and not limited to physical examination!    Examination:  Right Ankle and Foot  Edema: Positive.   Ecchymosis/Bruising: Positive.   Percussion Test: Positive           Tuning Fork Test: Positive    Orientation:   Positive  Asymmetrical, because of RIGHT ankle pain and swelling.          ROM:   Positive, Decreased  plantarflexion and dorsiflexion due to pain            Muscle Strength: Positive   +3/+5 Planar Flexion, Decreased due to pain  +3/+5  Dorsi Flexion, Decreased due to pain     +3/+5 Inversion  +3/+5 Eversion        +3/+5 Plantarflexion in combination with inversion  +3/+5 Plantarflexion in combination with eversion          +3/+5 Dorsiflexion in combination with inversion (Posterior Tibialis)  +3/+5 Dorsiflexion in combination with eversion (Peroneal Brevis)  +3/+5 Dorsiflexion in combination with eversion and flexion of great toe (Peroneal Longus).            Palpation:   Positive, Painful and Tender to Palpation Right medial malleolus, tibia/fibula           Vascular:   +2/+4 Dorsalis Pedis  +2/+4 Posterior Tibialis  Capillary Refill < 2 Seconds.        Leg/Ankle/Foot - Ankle Impingement:  Posterior Ankle Impingement Test: Negative.   Anterior Ankle Impingement Test: Negative.         Leg/Ankle/Foot - Ankle Instability:  Flexibility Test:  Positive.   Anterior Drawer Test:  Negative.   Talar Tilt " Test:  Negative.   External Rotation Kleiger Plantar Flexion Test: Positive  External Rotation Kleiger Dorsiflexion Test:  Positive  Squeeze Test:  Positive  Dorsiflexion Test:  Negative.   Posterior Tibial Instability Test: Negative.  Peroneal Tendon Instability Test:  Negative.  Horizontal Squeeze Test:  Positive.   Vertical Squeeze Test:  Positive.   Standing/Walking Test:  Positive, Painful.         Leg/Ankle/Foot - DVT:  Maikol's Sign: Negative.         Leg/Ankle/Foot - Forefoot:  Strunsky Test:  Negative.   Gaenslen Maneuver Test: Negative.   Metatarsal Tap Test: Negative  Crepitation Test: Negative.         Leg/Ankle/Foot - Hindfoot Achilles/Calcaneus:  Salazar Compression Test: Negative.   Hoffa Sign: Negative.   Achilles Tendon Tap Test: Negative.   Heel Compression Test: Negative.         Leg/Ankle/Foot - Nerve Irritation:  Sahara Sign: Negative.   Digital Nerve Stretch Test: Negative.   Tinel Sign: Negative.   Tourniquet Sign: Negative.         Feet/Foot:   Positive BILATERAL  Valgus foot     Imaging and Diagnostics Review:  Plain radiographs ordered during today's visit and independently reviewed.  Patient has spiral fracture of the distal fibula with marked swelling    Assessment   1. Complete tear of ligament of ankle    2. Partial tear of ligament of lateral aspect of ankle    3. Nondisplaced comminuted fracture of shaft of left fibula, subsequent encounter for closed fracture with routine healing    4. High ankle sprain, right, subsequent encounter    5. Peroneus brevis tendinitis, right    6. Peroneus longus tendinitis, right    7. Right ankle injury, subsequent encounter    8. Sprain of right ankle, subsequent encounter      Plan   Treatment or Intervention:  May use PRICE therapy as needed.  Possibility in future start into Physical Therapy 1-2 times a week for 8-10 weeks with manual therapy as well as dry needling and IASTM  Again stressed the importance of wearing shoes with good stability  control to help with the biomechanics affecting the lower legs  Again stressed the importance of wearing full foot insoles to help with the biomechanics affecting the lower legs  Recommendation over-the-counter calcium 500mg, 3 times a day with vitamin-D3 8084-8181+ units a day with food as well as a daily multivitamin  Recommendation over-the-counter Move Free for joint health  May take OTC Tylenol Extra Strength or OTC Tylenol Arthritis, taking one every 6-8 hours with food as needed for pain management.  Patient advised regarding the risk and/or potential adverse reactions and/or side effects of any prescribed medications along with any over-the-counter medications or any supplements used. Patient advised to seek immediate medical care if any adverse reactions occur. The patient and/or patient(s) parent(s) verbalized their understanding.  Discussed in detail with the patient to the level of their understanding the possibility in the future of regenerative injections versus corticosteroids injections  Reviewed RIGHT ANKLE MRI in detail with the patient and/or patients parent/legal guardian to their level of understanding; a copy of these results were provided to the patient and/or patients parent/legal guardian at the time of this office visit.   Continue to use crutches nonweight bearing     Diagnostic studies:  Interpreted By:  Elvira Pineda and Stephens Katherine   STUDY: MRI of the right ankle without IV contrast;  12/19/2024 9:05 am      INDICATION: Signs/Symptoms:RIGHT ANKLE PAIN.      COMPARISON: None.      ACCESSION NUMBER(S): [id number]      ORDERING CLINICIAN: APRIL DRIVER      TECHNIQUE: MR imaging of the  right ankle was obtained  without administration  of intravenous contrast medium.      FINDINGS:  TENDONS:  The extensor tendons are intact and demonstrate a normal course. Mild tendinosis and tenosynovitis of the posterior tibial and flexor  hallucis longus tendons. Moderate tendinosis of  the peroneus longus tendon. Split tear of the peroneus brevis tendon with distal  reconstitution. The Achilles tendon is intact with mild tendinosis.  The plantar aponeurosis is intact.      LIGAMENTS:  High-grade partial-thickness tear of the anterior inferior tibiofibular ligament with increased attenuation of the central  fibers. Nonvisualization of the anterior talofibular ligament consistent with tear. High-grade sprain of the calcaneofibular  ligament. The posterior talofibular ligament is intact. The posterior tibiofibular ligament is intact. The deep and superficial components  of the deltoid ligament are intact. The spring ligament is intact.      JOINTS:  There is no dislocation. There is no joint effusion. The articular cartilage of the ankle joint is normal. There is no osteochondral  defect in the talar dome.      OSSEOUS STRUCTURES:  Re-demonstration of a subacute nondisplaced distal fibular fracture.  There is no marrow replacing lesion. A benign cyst is noted in the lateral cuneiform.      SOFT TISSUES:  There is no muscle atrophy or tear.  The tarsal tunnel is normal.  The sinus tarsi is normal with preservation of fat signal.      IMPRESSION:  1. Nonvisualization of the anterior talofibular ligament consistent with complete tear.  2. High-grade partial-thickness tear of the anterior inferior tibiofibular ligament.  3. Re-demonstration of subacute nondisplaced distal fibular fracture.  4. High-grade sprain of the calcaneofibular ligament.  5. Split tear of the peroneus brevis tendon with distal reconstitution.  6. Moderate peroneus longus, mild posterior tibial and flexor hallucis longus tendinosis.        I personally reviewed the images/study and I agree with Noemi Freeman DO's (radiology resident) findings as stated. This study  was interpreted at [place], [place].      MACRO: None      Signed by: Elvira Pineda 12/19/2024 5:31 PM  Dictation  workstation:   [id number]    Activity Instructions, Restrictions, and Accommodations:      Consultations/Referrals:  Physical therapy    Follow-up:  Follow up 3 WEEKS SAM  Total appointment time _30_ minutes. Greater than 50% spent counseling patient on results of physical exam, treatment options as well as results of ordered imaging and treatment for results, need for PT and expected outcomes, as well as discussing possible medications.    Deven Chu CNP

## 2024-12-23 ENCOUNTER — OFFICE VISIT (OUTPATIENT)
Dept: SPORTS MEDICINE | Facility: CLINIC | Age: 69
End: 2024-12-23
Payer: MEDICARE

## 2024-12-23 ENCOUNTER — TELEPHONE (OUTPATIENT)
Dept: SPORTS MEDICINE | Facility: CLINIC | Age: 69
End: 2024-12-23
Payer: MEDICARE

## 2024-12-23 VITALS
WEIGHT: 305 LBS | BODY MASS INDEX: 35.29 KG/M2 | DIASTOLIC BLOOD PRESSURE: 76 MMHG | HEART RATE: 81 BPM | HEIGHT: 78 IN | SYSTOLIC BLOOD PRESSURE: 124 MMHG

## 2024-12-23 DIAGNOSIS — S93.401D SPRAIN OF RIGHT ANKLE, SUBSEQUENT ENCOUNTER: ICD-10-CM

## 2024-12-23 DIAGNOSIS — S99.911D RIGHT ANKLE INJURY, SUBSEQUENT ENCOUNTER: ICD-10-CM

## 2024-12-23 DIAGNOSIS — S93.409A COMPLETE TEAR OF LIGAMENT OF ANKLE: ICD-10-CM

## 2024-12-23 DIAGNOSIS — S93.499A PARTIAL TEAR OF LIGAMENT OF LATERAL ASPECT OF ANKLE: ICD-10-CM

## 2024-12-23 DIAGNOSIS — S93.491D HIGH ANKLE SPRAIN, RIGHT, SUBSEQUENT ENCOUNTER: ICD-10-CM

## 2024-12-23 DIAGNOSIS — M76.71 PERONEUS LONGUS TENDINITIS, RIGHT: ICD-10-CM

## 2024-12-23 DIAGNOSIS — M76.71 PERONEUS BREVIS TENDINITIS, RIGHT: ICD-10-CM

## 2024-12-23 DIAGNOSIS — S82.455D NONDISPLACED COMMINUTED FRACTURE OF SHAFT OF LEFT FIBULA, SUBSEQUENT ENCOUNTER FOR CLOSED FRACTURE WITH ROUTINE HEALING: ICD-10-CM

## 2024-12-23 PROCEDURE — 3074F SYST BP LT 130 MM HG: CPT | Performed by: NURSE PRACTITIONER

## 2024-12-23 PROCEDURE — 1123F ACP DISCUSS/DSCN MKR DOCD: CPT | Performed by: NURSE PRACTITIONER

## 2024-12-23 PROCEDURE — 1036F TOBACCO NON-USER: CPT | Performed by: NURSE PRACTITIONER

## 2024-12-23 PROCEDURE — 3008F BODY MASS INDEX DOCD: CPT | Performed by: NURSE PRACTITIONER

## 2024-12-23 PROCEDURE — 99214 OFFICE O/P EST MOD 30 MIN: CPT | Performed by: NURSE PRACTITIONER

## 2024-12-23 PROCEDURE — L4361 PNEUMA/VAC WALK BOOT PRE OTS: HCPCS | Performed by: NURSE PRACTITIONER

## 2024-12-23 PROCEDURE — 1125F AMNT PAIN NOTED PAIN PRSNT: CPT | Performed by: NURSE PRACTITIONER

## 2024-12-23 PROCEDURE — 3078F DIAST BP <80 MM HG: CPT | Performed by: NURSE PRACTITIONER

## 2024-12-23 PROCEDURE — 1159F MED LIST DOCD IN RCRD: CPT | Performed by: NURSE PRACTITIONER

## 2024-12-23 ASSESSMENT — ENCOUNTER SYMPTOMS
LOSS OF SENSATION IN FEET: 0
DEPRESSION: 0
OCCASIONAL FEELINGS OF UNSTEADINESS: 0

## 2024-12-23 ASSESSMENT — PAIN SCALES - GENERAL: PAINLEVEL_OUTOF10: 1

## 2024-12-26 ENCOUNTER — TELEPHONE (OUTPATIENT)
Dept: CARDIOLOGY | Facility: CLINIC | Age: 69
End: 2024-12-26

## 2024-12-26 DIAGNOSIS — I48.0 PAROXYSMAL ATRIAL FIBRILLATION (MULTI): Primary | ICD-10-CM

## 2024-12-26 RX ORDER — LISINOPRIL 10 MG/1
10 TABLET ORAL DAILY
Qty: 90 TABLET | Refills: 3 | Status: SHIPPED | OUTPATIENT
Start: 2024-12-26 | End: 2025-12-26

## 2024-12-26 NOTE — TELEPHONE ENCOUNTER
Rx Refill Request- 90 day supply    Transfer patient from Dr. Jin     Scheduled 1/23/2025    Pharmacy:     [facility] - [place] - [address] [facility] Phone: [phone]   Fax: [phone]        Medication:      Dispensed Days Supply Quantity Provider Pharmacy   LISINOPRIL 10MG TAB 09/28/2024 90 90 tablet Kd Jin MD [facility] PRESCRIPTIO       *Patient has 2 days left*

## 2025-01-06 ENCOUNTER — APPOINTMENT (OUTPATIENT)
Dept: PHYSICAL THERAPY | Facility: CLINIC | Age: 70
End: 2025-01-06
Payer: MEDICARE

## 2025-01-06 DIAGNOSIS — S99.911A RIGHT ANKLE INJURY, INITIAL ENCOUNTER: Primary | ICD-10-CM

## 2025-01-06 DIAGNOSIS — S93.401A SPRAIN OF RIGHT ANKLE, INITIAL ENCOUNTER: ICD-10-CM

## 2025-01-06 DIAGNOSIS — S82.446A: ICD-10-CM

## 2025-01-06 DIAGNOSIS — S82.51XA AVULSION FRACTURE OF MEDIAL MALLEOLUS OF RIGHT TIBIA, CLOSED, INITIAL ENCOUNTER: ICD-10-CM

## 2025-01-06 DIAGNOSIS — S93.491A HIGH ANKLE SPRAIN OF RIGHT LOWER EXTREMITY, INITIAL ENCOUNTER: ICD-10-CM

## 2025-01-06 PROCEDURE — 97161 PT EVAL LOW COMPLEX 20 MIN: CPT | Mod: GP

## 2025-01-06 ASSESSMENT — PAIN SCALES - GENERAL: PAINLEVEL_OUTOF10: 1

## 2025-01-06 ASSESSMENT — PAIN - FUNCTIONAL ASSESSMENT: PAIN_FUNCTIONAL_ASSESSMENT: 0-10

## 2025-01-06 ASSESSMENT — PAIN DESCRIPTION - DESCRIPTORS: DESCRIPTORS: DULL;ACHING

## 2025-01-06 NOTE — PROGRESS NOTES
Physical Therapy Evaluation    Patient Name: Kiel Cantrell  MRN: [id number]  Evaluation Date: 1/6/2025                   Problem List Items Addressed This Visit             ICD-10-CM    Avulsion fracture of medial malleolus of right tibia, closed, initial encounter S82.51XA    Closed nondisplaced spiral fracture of shaft of fibula, initial encounter S82.446A     Other Visit Diagnoses         Codes    Right ankle injury, initial encounter    -  Primary S99.911A    Relevant Orders    Follow Up In Physical Therapy    Sprain of right ankle, initial encounter     S93.401A    High ankle sprain of right lower extremity, initial encounter     S93.491A    Relevant Orders    Follow Up In Physical Therapy              Subjective   Patient reported hx of condition: In early December pt fell on ice and hurt R ankle, after a few days went Urgent Care and had x-rays which revealed fibular fx. Pt then came to sports medicine and had MRI, results below. Pt has been using tall walking boot since for all mobility, uses crutches for walking. Pain has been okay, can be present at random at rest and when walking, doing, stairs, etc. Swelling also seems to be getting better. Next Sports Med follow up 1/13/25.     Precautions:  Precautions  Precautions Comment: Per Bc Chu, pt now can be WBAT in boot as able, progressing to show as able    Relevant PMH:  Thyroid disorder, Afib, L TKA 2020    Red Flags: Do you have any of the following? No  Fever/chills, unexplained weight changes, dizziness/fainting, unexplained change in bowel or bladder functions, unexplained malaise or muscle weakness, night pain/sweats, numbness or tingling    Pain:  Pain Assessment: 0-10  0-10 (Numeric) Pain Score: 1 (Pain at max 4/10)  Pain Location: Ankle  Pain Orientation: Right  Pain Radiating Towards: does not radiate  Pain Descriptors: Dull, Aching  Pain Frequency: Intermittent  Effect of Pain on Daily Activities: Pt has to wear boot for all mobility;  pain/WB status limit standing, walking, stairs, other WB mobility    Home Living:  Home type: House  Stairs: Yes  Lives with: Spouse  Occupation: part time job doing painting    Prior Function Per Pt/Caregiver Report:  Prior Function Comments: Functionally independent without pain or restriction    Patient's Goal for PT:  Return to normal activities    Imaging:  IMPRESSION:  1. Nonvisualization of the anterior talofibular ligament consistent  with complete tear.  2. High-grade partial-thickness tear of the anterior inferior  tibiofibular ligament.  3. Re-demonstration of subacute nondisplaced distal fibular fracture.  4. High-grade sprain of the calcaneofibular ligament.  5. Split tear of the peroneus brevis tendon with distal  reconstitution.  6. Moderate peroneus longus, mild posterior tibial and flexor  hallucis longus tendinosis.    OBJECTIVE:  Objective   Range of Motion:  Ankle AROM L R   Dorsiflexion 10 deg 0 deg   Plantarflexion 45 deg 25 deg   Eversion 15 deg 5 deg   Inversion 20 deg 10 deg     Strength:  Ankle MMT L R   Dorsiflexion 5/5 3/5   Plantarflexion 5/5 3/5   Eversion 5/5 3/5   Inversion 5/5 3/5     Flexibility:  Flexibility Comment: Tight R gastrocs  Palpation:  Palpation Comment: TTP R distal fibula, ATFL  Special Tests:  Special Tests Comment: Did not perform  Gait:  Gait Comment: With B crutches, 3-point modified gait. Distances limited, intermittent pain  Balance:  Balance Comment: Did not assess due to presence of fx  Stairs:  Stairs Comment: Performs nonreciprocally with heavy rail support    Outcome Measures:  Other Measures  Lower Extremity Funtional Score (LEFS): 39/80     Assessment  PT Assessment Results: Decreased strength, Decreased range of motion, Decreased mobility, Pain  Rehab Prognosis: Good    Pt is a 69 y.o. male who presents with impairments listed above. These impairments have led to functional limitations including limited tolerance of ADLs as well as difficulty with  walking, standing, stairs, lifting, and other WB mobility. Pt would benefit from skilled physical therapy intervention to improve above impairments and facilitate return to function.    Complexity of Evaluation: Low    Based on the history including personal factors and/or comorbidities, examination of body systems including body structures and function, activity limitations, and/or participation restrictions, as well as clinical presentation, patient meets criteria for above complexity evaluation.    Plan  Treatment/Interventions: Dry needling, Electrical stimulation, Manual therapy, Neuromuscular re-education, Taping techniques, Therapeutic activities, Therapeutic exercises, Ultrasound  PT Plan: Skilled PT  PT Frequency: 2 times per week  Duration: 12 visits  Certification Period Start Date: 01/06/25  Certification Period End Date: 04/06/25  Number of Treatments Authorized: MN  Rehab Potential: Good  Plan of Care Agreement: Patient    Insurance Plan: Payor: [employer] / Plan: [employer] / Product Type: *No Product type* /     Plan for next visit: initiate AROM, intrinsic exercises, WB as able, manual/modalities PRN    OP EDUCATION:  Outpatient Education  Individual(s) Educated: Patient  Education Provided: Body Mechanics, Anatomy, Home Exercise Program, POC, Posture  Diagnosis and Precautions: R ankle pain  Risk and Benefits Discussed with Patient/Caregiver/Other: yes  Patient/Caregiver Demonstrated Understanding: yes  Plan of Care Discussed and Agreed Upon: yes  Patient Response to Education: Patient/Caregiver Performed Return Demonstration of Exercises/Activities, Patient/Caregiver Verbalized Understanding of Information, Patient/Caregiver Asked Appropriate Questions    Transferring POC to Yordan Fraga PT as pt is already scheduled out with that provider. Pt in agreement with plan    Today's Treatment:  No treatment today, will wait until after follow up with Sports Med and repeat x-ray to begin  treatment     Goals:  Active       PT Problem       PT Goal 1       Start:  01/06/25    Expected End:  02/20/25            PT Goal 2       Start:  01/06/25    Expected End:  04/06/25            PT Goal 3       Start:  01/06/25    Expected End:  04/06/25            PT Goal 4       Start:  01/06/25    Expected End:  04/06/25            PT Goal 5       Start:  01/06/25    Expected End:  04/06/25            Patient Stated Goal 1       Start:  01/06/25    Expected End:  04/06/25            Patient Stated Goal 2       Start:  01/06/25    Expected End:  04/06/25              STG, 6 visits:  Pt will be independent in HEP to improve R ankle and foot strength, ROM, and function.  Pt will perform household distance walking without assistive device, without significant deviation, and without pain.    Pt will improve R ankle pain-free ROM to full in all planes for improved body mechanics during functional mobility.  Pt will increase strength in R ankle by 1/2 MMT in all planes for increased tolerance to functional activities.  Pt will maintain SLS on R LE for 10s with EO for improved ankle proprioception and stability.  Pt will ambulate long community distances across all surfaces without pain or limitation.  Pt will tolerate >1 hour of standing activity without onset of pain for improved tolerance to work as a .   Pt will ascend/descend 2 flights of stairs reciprocally without pain for improved performance of household and community mobility.

## 2025-01-06 NOTE — PROGRESS NOTES
Physical Therapy Evaluation    Patient Name: Kiel Cantrell  MRN: [id number]  Evaluation Date: 1/6/2025  Time Calculation  Start Time: 1510  Stop Time: 1540  Time Calculation (min): 30 min  PT Evaluation Time Entry  PT Evaluation (Low) Time Entry: 30             Problem List Items Addressed This Visit             ICD-10-CM    Avulsion fracture of medial malleolus of right tibia, closed, initial encounter S82.51XA    Closed nondisplaced spiral fracture of shaft of fibula, initial encounter S82.446A     Other Visit Diagnoses         Codes    Right ankle injury, initial encounter    -  Primary S99.911A    Relevant Orders    Follow Up In Physical Therapy    Sprain of right ankle, initial encounter     S93.401A    High ankle sprain of right lower extremity, initial encounter     S93.491A    Relevant Orders    Follow Up In Physical Therapy              Subjective   Patient reported hx of condition: In early December pt fell on ice and hurt R ankle, after a few days went Urgent Care and had x-rays which revealed fibular fx. Pt then came to sports medicine and had MRI, results below. Pt has been using tall walking boot since for all mobility, uses crutches for walking. Pain has been okay, can be present at random at rest and when walking, doing, stairs, etc. Swelling also seems to be getting better. Next Sports Med follow up 1/13/25.     Precautions:  Precautions  Precautions Comment: Per Bc Chu, pt now can be WBAT in boot as able, progressing to show as able    Relevant PMH:  Thyroid disorder, Afib, L TKA 2020    Red Flags: Do you have any of the following? No  Fever/chills, unexplained weight changes, dizziness/fainting, unexplained change in bowel or bladder functions, unexplained malaise or muscle weakness, night pain/sweats, numbness or tingling    Pain:  Pain Assessment: 0-10  0-10 (Numeric) Pain Score: 1 (Pain at max 4/10)  Pain Location: Ankle  Pain Orientation: Right  Pain Radiating Towards: does not  radiate  Pain Descriptors: Dull, Aching  Pain Frequency: Intermittent  Effect of Pain on Daily Activities: Pt has to wear boot for all mobility; pain/WB status limit standing, walking, stairs, other WB mobility    Home Living:  Home type: House  Stairs: Yes  Lives with: Spouse  Occupation: part time job doing painting    Prior Function Per Pt/Caregiver Report:  Prior Function Comments: Functionally independent without pain or restriction    Patient's Goal for PT:  Return to normal activities    Imaging:  IMPRESSION:  1. Nonvisualization of the anterior talofibular ligament consistent  with complete tear.  2. High-grade partial-thickness tear of the anterior inferior  tibiofibular ligament.  3. Re-demonstration of subacute nondisplaced distal fibular fracture.  4. High-grade sprain of the calcaneofibular ligament.  5. Split tear of the peroneus brevis tendon with distal  reconstitution.  6. Moderate peroneus longus, mild posterior tibial and flexor  hallucis longus tendinosis.    OBJECTIVE:  Objective   Range of Motion:  Ankle AROM L R   Dorsiflexion 10 deg 0 deg   Plantarflexion 45 deg 25 deg   Eversion 15 deg 5 deg   Inversion 20 deg 10 deg     Strength:  Ankle MMT L R   Dorsiflexion 5/5 3/5   Plantarflexion 5/5 3/5   Eversion 5/5 3/5   Inversion 5/5 3/5     Flexibility:  Flexibility Comment: Tight R gastrocs  Palpation:  Palpation Comment: TTP R distal fibula, ATFL  Special Tests:  Special Tests Comment: Did not perform  Gait:  Gait Comment: With B crutches, 3-point modified gait. Distances limited, intermittent pain  Balance:  Balance Comment: Did not assess due to presence of fx  Stairs:  Stairs Comment: Performs nonreciprocally with heavy rail support    Outcome Measures:  Other Measures  Lower Extremity Funtional Score (LEFS): 39/80     Assessment  PT Assessment Results: Decreased strength, Decreased range of motion, Decreased mobility, Pain  Rehab Prognosis: Good    Pt is a 69 y.o. male who presents with  impairments listed above. These impairments have led to functional limitations including limited tolerance of ADLs as well as difficulty with walking, standing, stairs, lifting, and other WB mobility. Pt would benefit from skilled physical therapy intervention to improve above impairments and facilitate return to function.    Complexity of Evaluation: Low    Based on the history including personal factors and/or comorbidities, examination of body systems including body structures and function, activity limitations, and/or participation restrictions, as well as clinical presentation, patient meets criteria for above complexity evaluation.    Plan  Treatment/Interventions: Dry needling, Electrical stimulation, Manual therapy, Neuromuscular re-education, Taping techniques, Therapeutic activities, Therapeutic exercises, Ultrasound  PT Plan: Skilled PT  PT Frequency: 2 times per week  Duration: 12 visits  Certification Period Start Date: 01/06/25  Certification Period End Date: 04/06/25  Number of Treatments Authorized: MN  Rehab Potential: Good  Plan of Care Agreement: Patient    Insurance Plan: Payor: [employer] / Plan: [employer] / Product Type: *No Product type* /     Plan for next visit: initiate AROM, intrinsic exercises, WB as able, manual/modalities PRN    OP EDUCATION:  Outpatient Education  Individual(s) Educated: Patient  Education Provided: Body Mechanics, Anatomy, Home Exercise Program, POC, Posture  Diagnosis and Precautions: R ankle pain  Risk and Benefits Discussed with Patient/Caregiver/Other: yes  Patient/Caregiver Demonstrated Understanding: yes  Plan of Care Discussed and Agreed Upon: yes  Patient Response to Education: Patient/Caregiver Performed Return Demonstration of Exercises/Activities, Patient/Caregiver Verbalized Understanding of Information, Patient/Caregiver Asked Appropriate Questions    Transferring POC to Yordan Fraga PT as pt is already scheduled out with that provider. Pt  in agreement with plan    Today's Treatment:  No treatment today, will wait until after follow up with Sports Med and repeat x-ray to begin treatment     Goals:  Active       PT Problem       STG, 6 visits:       Start:  01/06/25    Expected End:  02/20/25       Pt will be independent in HEP to improve R ankle and foot strength, ROM, and function.  Pt will perform household distance walking without assistive device, without significant deviation, and without pain.         Pt will improve R ankle pain-free ROM to full in all planes for improved body mechanics during functional mobility.        Start:  01/06/25    Expected End:  04/06/25            Pt will increase strength in R ankle by 1/2 MMT in all planes for increased tolerance to functional activities.        Start:  01/06/25    Expected End:  04/06/25            Pt will maintain SLS on R LE for 10s with EO for improved ankle proprioception and stability.        Start:  01/06/25    Expected End:  04/06/25            Pt will ambulate long community distances across all surfaces without pain or limitation.        Start:  01/06/25    Expected End:  04/06/25            Pt will tolerate >1 hour of standing activity without onset of pain for improved tolerance to work as a .         Start:  01/06/25    Expected End:  04/06/25            Pt will ascend/descend 2 flights of stairs reciprocally without pain for improved performance of household and community mobility.        Start:  01/06/25    Expected End:  04/06/25

## 2025-01-09 ENCOUNTER — APPOINTMENT (OUTPATIENT)
Dept: PHYSICAL THERAPY | Facility: CLINIC | Age: 70
End: 2025-01-09
Payer: MEDICARE

## 2025-01-10 ENCOUNTER — TELEPHONE (OUTPATIENT)
Dept: SPORTS MEDICINE | Facility: CLINIC | Age: 70
End: 2025-01-10
Payer: MEDICARE

## 2025-01-10 NOTE — TELEPHONE ENCOUNTER
Patient called and left message with answering service stating:  Dermatologist wants to give prednisone to the pt, was told   to call to let this know to dr. Su              I am also adding Deven on this since it was his patient

## 2025-01-10 NOTE — PROGRESS NOTES
Verbal consent of the patient and/or verbal parental consent for patients under the age of 18 have been obtained to conduct a physical examination at this office visit.    Established patient  History Of Present Illness  01/14/25 Kiel Cantrell is a 69 y.o. male who presents for a follow up of their Right ankle fracture s/p 6 weeks tall XP walking boot and crutches.  States that he is having 1/10 pain today with prolonged walking.  Still has a significant amount of swelling in his lower legs both legs obviously the 1 that fractured has some more swelling.  X-ray shows that sclerosis is noted however he does have significant varicose veins and venous insufficiency so we will get him into see Dr. Richmond.  Also stressed to him the importance of continuing to take his calcium as well as his vitamin D and the supplement  Symptum to help with bony healing.  I told him he should should still  use crutches while in the walking boot and additionally gave him an even up to take pressure off of his knees.  Additionally we discussed his MRI once again and all of the ligaments and tendons that he injured.. He was seen in by Mikel Estrada in [place]. No new or worsening symptoms.  We also talked about regenerative injections in the future if needed.    All previous Progress Notes and imaging results related to this patients chief complaint have been reviewed in preparation for this examination.    Past Medical History  He has a past medical history of Acute deep vein thrombosis (DVT) of calf muscle vein of left lower extremity (Multi) (11/08/2023), HL (hearing loss), Unspecified atrial fibrillation (Multi) (03/27/2014), and Visual impairment.    Surgical History  He has a past surgical history that includes Vasectomy and Eye surgery.     Social History  He reports that he has never smoked. He has never been exposed to tobacco smoke. He has never used smokeless tobacco. He reports current alcohol use of about 3.0 standard drinks of  "alcohol per week. He reports that he does not use drugs.    Family History  Family History   Problem Relation Name Age of Onset    Early natural death Mother Nara Catnrell     Miscarriages / Stillbirths Mother Nara Cantrell     Lung disease Father Nolan Cantrell     COPD Father Nolan Cantrell     No Known Problems Sister      No Known Problems Daughter      No Known Problems Son       History Of Present Illness  12/9/24 Kiel Cantrell is a 69 y.o. male presenting with RIGHT ankle pain. Patient reports he fell on ice in the driveway last Tuesday and the R ankle was \"locked in place\" and could not move, and was forced in to inversion. Patient stated his R knee hurt more than his ankle at first, but he first noticed the ankle pain when he stood on it when he got up. Patient relays he attempted to move his R ankle to relieve the pain as he had with previous ankle injuries noting it did not decrease.  Patient went to an urgent care location 12/8/24, had X-rays, and was provided a walking boot. Patient has been using crutches he had at home for a past knee surgery. Patient came into the appointment without the walking boot on because it is too painful due to swelling. Patient reports 3/10 at rest, noting it was 8/10 at the time of the injury. Patient states the pain has been gradually improving, but still present with weight bearing.  We reviewed patient x-ray results in detail.  Patient x-rays show a spiral fracture of the distal fibula as well as significant swelling.  Upon exam patient has indications of a high ankle sprain particularly over the syndesmotic ligaments as well as medial and lateral ankle sprain and marked tenderness over the talus with concern for occult fracture.  As such after discussion due to patient's physical exam and due to the nature of the injury we will order an MRI to evaluate for underlying soft tissue injury and concern for need for referral to orthopedic surgery for syndesmotic repair.  We will have " "patient continue with his walking boot and will be nonweightbearing in a pair of crutches.  Patient is to not drive.  We can follow-up after MRI and adjust treatment as indicated at that time.  Patient and spouse verbalized understanding and agreement with plan of care.  ----------------------------------------------------------  12/23/24 Kiel Cantrell is a 69 y.o. male presenting for his MRI follow up of his RIGHT ankle. Since last visit in office, patient states that he feels improved. Presents using crutches and wearing tall walking boot as previously instructed. Rates current pain as a 1/10 that is \"every so often\".  We reviewed patient MRI results in detail.  Patient and spouse verbalized understanding of results.  We discussed treatment options and will have patient continue with walking boot and crutches as previously prescribed as well as physical therapy.  Patient will also continue with calcium and vitamin D as previously recommended.  We can reevaluate in 4 weeks for mery-ray and adjust treatment as indicated at that time.  Patient does not warrant surgery at this time but we will reevaluate at a future appointment and if patient continues to have significant pain after completing therapy and complete resolution of his fracture we can consider referral to surgery for reevaluation.  Patient and spouse verbalized understanding and agreement with plan of care.      Allergies  Patient has no known allergies.    Review of Systems  CONSTITUTIONAL:   Negative for weight change, loss of appetite, fatigue, weakness, fever, chills, night sweats, headaches .           HEENT:   Negative for cold, cough, sore throat, sinus pain, swollen lymph nodes.           OPHTHALMOLOGY:   Negative for diminished vision, blurred vision, loss of vision, double vision.           ALLERGY:   Negative for runny nose, scratchy throat, sinus congestion, rash, facial pressure, nasal congestion, post-nasal drip.           CARDIOLOGY: "   Negative for chest pain, palpitations, murmurs, irregular heart beat, shortness of breath, leg edema, dyspnea on exertion, fatigue, dizziness.           RESPIRATORY:   Negative for chest pain, shortness of breath, swelling of the legs, asthma/copd, chest congestion, pain with breathing .           GASTROENTEROLOGY:   Negative for nausea, vomitting, heartburn, constipation, diarrhea, blood in stool, change in bowel habits, black stool.           HEMATOLOGY/LYMPH:   Negative for fatigue, loss of appetitie, easy bruising, easy bleeding, anemia, abnormal bleeding, slow healing.           ENDOCRINOLOGY:   Negative for polyuria, polydipsia, polyphagia, fatigue, weight loss, weight gain, cold intolerance, heat intolerance, diabetes.           MUSCULOSKELETAL:   Positive  for Right ankle        DERMATOLOGY:   Negative for rash, bruising.           NEUROLOGY:   Negative for tingling, numbness, gait abnormality, paresthesias, weakness, sciatica.        Examination:    Right Ankle and Foot  Edema: Positive.  Diffusely  Ecchymosis/Bruising: Positive.  Lateral  Percussion Test: Positive     distal fibula      Tuning Fork Test: Positive distal fibula      Orientation:   Positive  Asymmetrical, because of RIGHT ankle pain and swelling.          ROM:   Positive, Decreased  plantarflexion and dorsiflexion due to pain as well as inversion and eversion           Muscle Strength: Positive decreased muscle strength  +3/+5 Planar Flexion, Decreased due to pain and swelling  +3/+5  Dorsi Flexion  Decreased due to pain and swelling  +3/+5 Inversion Decreased due to pain and swelling  +3/+5 Eversion   Decreased due to pain and swelling     +3/+5 Plantarflexion in combination with inversion Decreased due to pain and swelling  +3/+5 Plantarflexion in combination with eversion   Decreased due to pain and swelling        +3/+5 Dorsiflexion in combination with inversion (Posterior Tibialis)  +3/+5 Dorsiflexion in combination with eversion  (Peroneal Brevis)Decreased due to pain and swelling  +3/+5 Dorsiflexion in combination with eversion and flexion of great toe (Peroneal Longus).    Decreased due to pain and swelling         Palpation:   Positive, Painful and Tender to Palpation Right lateral greater than medial malleolus, as well as distal fibula and over the ATF and CF ligament and anterior inferior tib-fib ligament         Vascular:   +2/+4 Dorsalis Pedis  +2/+4 Posterior Tibialis  Capillary Refill < 2 Seconds.        Leg/Ankle/Foot - Ankle Impingement:  Posterior Ankle Impingement Test: Negative.   Anterior Ankle Impingement Test: Negative.          Leg/Ankle/Foot - Ankle Instability:  Flexibility Test:  Positive.   Anterior Drawer Test:   Positive.   Talar Tilt Test:  Negative.   External Rotation Kleiger Plantar Flexion Test: Positive  External Rotation Kleiger Dorsiflexion Test:  Positive  Squeeze Test:  Positive  Dorsiflexion Test:  Negative.   Posterior Tibial Instability Test: Negative.  Peroneal Tendon Instability Test:  Negative.  Horizontal Squeeze Test:  Positive.   Vertical Squeeze Test:  Positive.   Standing/Walking Test:  Positive, Painful.          Leg/Ankle/Foot - DVT:  Maikol's Sign: Negative.          Leg/Ankle/Foot - Forefoot:  Strunsky Test:  Negative.   Gaenslen Maneuver Test: Negative.   Metatarsal Tap Test: Negative  Crepitation Test: Negative.          Leg/Ankle/Foot - Hindfoot Achilles/Calcaneus:  Salazar Compression Test: Negative.   Hoffa Sign: Negative.   Achilles Tendon Tap Test: Negative.   Heel Compression Test: Negative.          Leg/Ankle/Foot - Nerve Irritation:  Sahara Sign: Negative.   Digital Nerve Stretch Test: Negative.   Tinel Sign: Negative.   Tourniquet Sign: Negative.          Feet/Foot:   Positive BILATERAL  Valgus foot        Imaging and Diagnostics Review:  New x-rays of the right foot and ankle ordered for my personal over read shows bony sclerosis with healing noticed in good  alignment  -------------------------------------------------------------------------------  Interpreted By:  Elvira Pineda and Stephens Katherine   STUDY: MRI of the right ankle without IV contrast;  12/19/2024 9:05 am      INDICATION: Signs/Symptoms:RIGHT ANKLE PAIN.      COMPARISON: None.      ACCESSION NUMBER(S): [id number]      ORDERING CLINICIAN: APRIL DRIVER      TECHNIQUE: MR imaging of the  right ankle was obtained  without administration  of intravenous contrast medium.      FINDINGS:  TENDONS:  The extensor tendons are intact and demonstrate a normal course. Mild tendinosis and tenosynovitis of the posterior tibial and flexor  hallucis longus tendons. Moderate tendinosis of the peroneus longus tendon. Split tear of the peroneus brevis tendon with distal  reconstitution. The Achilles tendon is intact with mild tendinosis.  The plantar aponeurosis is intact.      LIGAMENTS:  High-grade partial-thickness tear of the anterior inferior tibiofibular ligament with increased attenuation of the central  fibers. Nonvisualization of the anterior talofibular ligament consistent with tear. High-grade sprain of the calcaneofibular  ligament. The posterior talofibular ligament is intact. The posterior tibiofibular ligament is intact. The deep and superficial components  of the deltoid ligament are intact. The spring ligament is intact.      JOINTS:  There is no dislocation. There is no joint effusion. The articular cartilage of the ankle joint is normal. There is no osteochondral  defect in the talar dome.      OSSEOUS STRUCTURES:  Re-demonstration of a subacute nondisplaced distal fibular fracture.  There is no marrow replacing lesion. A benign cyst is noted in the lateral cuneiform.      SOFT TISSUES:  There is no muscle atrophy or tear.  The tarsal tunnel is normal.  The sinus tarsi is normal with preservation of fat signal.      IMPRESSION MRI of the right ankle December 19, 2024:  1. Nonvisualization of  the anterior talofibular ligament consistent with complete tear.  2. High-grade partial-thickness tear of the anterior inferior tibiofibular ligament.  3. Re-demonstration of subacute nondisplaced distal fibular fracture.  4. High-grade sprain of the calcaneofibular ligament.  5. Split tear of the peroneus brevis tendon with distal reconstitution.  6. Moderate peroneus longus, mild posterior tibial and flexor hallucis longus tendinosis.  7. Achilles tendon is intact with mild tendinosis.        I personally reviewed the images/study and I agree with Noemi Freeman DO's (radiology resident) findings as stated. This study  was interpreted at [place], [place].      MACRO: None      Signed by: Elvira Pineda 12/19/2024 5:31 PM  Dictation workstation:   [id number]    Assessment   1. Achilles tendinosis of right lower extremity        2. Complete tear of ligament of ankle  XR ankle right 3+ views    XR foot right 3+ views    Referral to Physical Therapy    s/p 6 weeks tall XP walking boot and crutches      3. Partial tear of ligament of lateral aspect of ankle  XR ankle right 3+ views    XR foot right 3+ views    Referral to Physical Therapy    s/p 6 weeks tall XP walking boot and crutches      4. Nondisplaced comminuted fracture of shaft of left fibula, subsequent encounter for closed fracture with routine healing  XR ankle right 3+ views    XR foot right 3+ views    Referral to Physical Therapy    s/p 6 weeks tall XP walking boot and crutches      5. High ankle sprain, right, subsequent encounter  XR ankle right 3+ views    XR foot right 3+ views    Referral to Physical Therapy    s/p 6 weeks tall XP walking boot and crutches      6. Peroneus brevis tendinitis, right  XR ankle right 3+ views    XR foot right 3+ views    Referral to Physical Therapy      7. Peroneus longus tendinitis, right  XR ankle right 3+ views    XR foot right 3+ views    Referral to Physical  Therapy      8. Right ankle injury, subsequent encounter  XR ankle right 3+ views    XR foot right 3+ views    Referral to Vascular Medicine    Referral to Physical Therapy      9. Sprain of right ankle, subsequent encounter  XR ankle right 3+ views    XR foot right 3+ views    Referral to Physical Therapy    s/p 6 weeks tall XP walking boot and crutches      10. Asymptomatic varicose veins  Referral to Vascular Medicine    Referral to Physical Therapy          Treatment or Intervention:  May use PRICE therapy as needed.  Continue physical Therapy 1-2 times a week for 8-10 weeks with manual therapy as well as dry needling and IASTM  Again stressed the importance of wearing shoes with good stability control to help with the biomechanics affecting the lower legs  Again stressed the importance of wearing full foot insoles to help with the biomechanics affecting the lower legs  Recommendation over-the-counter vitamin-D3 3099-6261+ units a day with food as well as a daily multivitamin  Recommendation over-the-counter Move Free for joint health  May take OTC Tylenol Extra Strength or OTC Tylenol Arthritis, taking one every 6-8 hours with food as needed for pain management.  Patient advised regarding the risk and/or potential adverse reactions and/or side effects of any prescribed medications along with any over-the-counter medications or any supplements used. Patient advised to seek immediate medical care if any adverse reactions occur. The patient and/or patient(s) parent(s) verbalized their understanding.  Patient given even up to balance out to take away knee and hip issues  Reviewed RIGHT ANKLE MRI in detail with the patient and/or patients parent/legal guardian to their level of understanding; a copy of these results were provided to the patient and/or patients parent/legal guardian at the time of this office visit.   Continue to use crutches nonweight bearing   Referral to Dr. Richmond for varicose veins and venous  insufficiency  Possibility regenerative injections in the future  Follow up 4 weeks for mery-ray reevaluation      Please note that this report has been produced using speech recognition software.  It may contain errors related to grammar, punctuation or spelling.  Electronically signed, but not reviewed.  JED Jett, Director of Sports Medicine    MARYANNE FRITZ on 1/14/25 at 8:55 AM.     ESVIN Jett DO

## 2025-01-13 ENCOUNTER — HOSPITAL ENCOUNTER (OUTPATIENT)
Dept: RADIOLOGY | Facility: CLINIC | Age: 70
Discharge: HOME | End: 2025-01-13
Payer: MEDICARE

## 2025-01-13 ENCOUNTER — OFFICE VISIT (OUTPATIENT)
Dept: SPORTS MEDICINE | Facility: CLINIC | Age: 70
End: 2025-01-13
Payer: MEDICARE

## 2025-01-13 ENCOUNTER — APPOINTMENT (OUTPATIENT)
Dept: PHYSICAL THERAPY | Facility: CLINIC | Age: 70
End: 2025-01-13
Payer: MEDICARE

## 2025-01-13 VITALS
WEIGHT: 305 LBS | DIASTOLIC BLOOD PRESSURE: 80 MMHG | HEIGHT: 78 IN | HEART RATE: 70 BPM | BODY MASS INDEX: 35.29 KG/M2 | SYSTOLIC BLOOD PRESSURE: 120 MMHG

## 2025-01-13 DIAGNOSIS — S93.401D SPRAIN OF RIGHT ANKLE, SUBSEQUENT ENCOUNTER: ICD-10-CM

## 2025-01-13 DIAGNOSIS — S93.491D HIGH ANKLE SPRAIN, RIGHT, SUBSEQUENT ENCOUNTER: ICD-10-CM

## 2025-01-13 DIAGNOSIS — S93.409A COMPLETE TEAR OF LIGAMENT OF ANKLE: ICD-10-CM

## 2025-01-13 DIAGNOSIS — M76.71 PERONEUS BREVIS TENDINITIS, RIGHT: ICD-10-CM

## 2025-01-13 DIAGNOSIS — S93.499A PARTIAL TEAR OF LIGAMENT OF LATERAL ASPECT OF ANKLE: ICD-10-CM

## 2025-01-13 DIAGNOSIS — S82.455D NONDISPLACED COMMINUTED FRACTURE OF SHAFT OF LEFT FIBULA, SUBSEQUENT ENCOUNTER FOR CLOSED FRACTURE WITH ROUTINE HEALING: ICD-10-CM

## 2025-01-13 DIAGNOSIS — S99.911D RIGHT ANKLE INJURY, SUBSEQUENT ENCOUNTER: ICD-10-CM

## 2025-01-13 DIAGNOSIS — M76.71 PERONEUS LONGUS TENDINITIS, RIGHT: ICD-10-CM

## 2025-01-13 DIAGNOSIS — I83.90 ASYMPTOMATIC VARICOSE VEINS: ICD-10-CM

## 2025-01-13 DIAGNOSIS — M67.88 ACHILLES TENDINOSIS OF RIGHT LOWER EXTREMITY: Primary | ICD-10-CM

## 2025-01-13 PROCEDURE — 1125F AMNT PAIN NOTED PAIN PRSNT: CPT | Performed by: FAMILY MEDICINE

## 2025-01-13 PROCEDURE — 73630 X-RAY EXAM OF FOOT: CPT | Mod: RIGHT SIDE

## 2025-01-13 PROCEDURE — 1159F MED LIST DOCD IN RCRD: CPT | Performed by: FAMILY MEDICINE

## 2025-01-13 PROCEDURE — 99214 OFFICE O/P EST MOD 30 MIN: CPT | Performed by: FAMILY MEDICINE

## 2025-01-13 PROCEDURE — 73630 X-RAY EXAM OF FOOT: CPT | Mod: RT

## 2025-01-13 PROCEDURE — 73610 X-RAY EXAM OF ANKLE: CPT | Mod: RT

## 2025-01-13 PROCEDURE — 3074F SYST BP LT 130 MM HG: CPT | Performed by: FAMILY MEDICINE

## 2025-01-13 PROCEDURE — 73610 X-RAY EXAM OF ANKLE: CPT | Mod: RIGHT SIDE

## 2025-01-13 PROCEDURE — 3008F BODY MASS INDEX DOCD: CPT | Performed by: FAMILY MEDICINE

## 2025-01-13 PROCEDURE — 1123F ACP DISCUSS/DSCN MKR DOCD: CPT | Performed by: FAMILY MEDICINE

## 2025-01-13 PROCEDURE — 3079F DIAST BP 80-89 MM HG: CPT | Performed by: FAMILY MEDICINE

## 2025-01-13 ASSESSMENT — ENCOUNTER SYMPTOMS
LOSS OF SENSATION IN FEET: 0
OCCASIONAL FEELINGS OF UNSTEADINESS: 0
DEPRESSION: 0

## 2025-01-13 ASSESSMENT — PATIENT HEALTH QUESTIONNAIRE - PHQ9
SUM OF ALL RESPONSES TO PHQ9 QUESTIONS 1 AND 2: 0
2. FEELING DOWN, DEPRESSED OR HOPELESS: NOT AT ALL
1. LITTLE INTEREST OR PLEASURE IN DOING THINGS: NOT AT ALL

## 2025-01-13 ASSESSMENT — COLUMBIA-SUICIDE SEVERITY RATING SCALE - C-SSRS
6. HAVE YOU EVER DONE ANYTHING, STARTED TO DO ANYTHING, OR PREPARED TO DO ANYTHING TO END YOUR LIFE?: NO
1. IN THE PAST MONTH, HAVE YOU WISHED YOU WERE DEAD OR WISHED YOU COULD GO TO SLEEP AND NOT WAKE UP?: NO
2. HAVE YOU ACTUALLY HAD ANY THOUGHTS OF KILLING YOURSELF?: NO

## 2025-01-13 ASSESSMENT — PAIN SCALES - GENERAL: PAINLEVEL_OUTOF10: 1

## 2025-01-13 NOTE — PATIENT INSTRUCTIONS
May use PRICE therapy as needed.  start into Physical Therapy 1-2 times a week for 8-10 weeks with manual therapy as well as dry needling and IASTM  Again stressed the importance of wearing shoes with good stability control to help with the biomechanics affecting the lower legs  Again stressed the importance of wearing full foot insoles to help with the biomechanics affecting the lower legs  Recommendation over-the-counter calcium 500mg, 3 times a day with vitamin-D3 5851-6828+ units a day with food as well as a daily multivitamin  Recommendation over-the-counter Move Free for joint health  May take OTC Tylenol Extra Strength or OTC Tylenol Arthritis, taking one every 6-8 hours with food as needed for pain management.  Patient advised regarding the risk and/or potential adverse reactions and/or side effects of any prescribed medications along with any over-the-counter medications or any supplements used. Patient advised to seek immediate medical care if any adverse reactions occur. The patient and/or patient(s) parent(s) verbalized their understanding.  Discussed in detail with the patient to the level of their understanding the possibility in the future of regenerative injections versus corticosteroids injections  Reviewed RIGHT ANKLE MRI in detail with the patient and/or patients parent/legal guardian to their level of understanding; a copy of these results were provided to the patient and/or patients parent/legal guardian at the time of this office visit.   Continue to use crutches nonweight bearing   Referral to Dr. Richmond  Follow up 4 weeks

## 2025-01-14 PROBLEM — M67.88 ACHILLES TENDINOSIS OF RIGHT LOWER EXTREMITY: Status: ACTIVE | Noted: 2025-01-14

## 2025-01-16 ENCOUNTER — APPOINTMENT (OUTPATIENT)
Dept: PHYSICAL THERAPY | Facility: CLINIC | Age: 70
End: 2025-01-16
Payer: MEDICARE

## 2025-01-20 ENCOUNTER — TREATMENT (OUTPATIENT)
Dept: PHYSICAL THERAPY | Facility: CLINIC | Age: 70
End: 2025-01-20
Payer: MEDICARE

## 2025-01-20 DIAGNOSIS — S99.911A RIGHT ANKLE INJURY, INITIAL ENCOUNTER: ICD-10-CM

## 2025-01-20 DIAGNOSIS — S93.491A HIGH ANKLE SPRAIN OF RIGHT LOWER EXTREMITY, INITIAL ENCOUNTER: ICD-10-CM

## 2025-01-20 PROCEDURE — 97110 THERAPEUTIC EXERCISES: CPT | Mod: GP | Performed by: PHYSICAL THERAPIST

## 2025-01-20 PROCEDURE — 97140 MANUAL THERAPY 1/> REGIONS: CPT | Mod: GP | Performed by: PHYSICAL THERAPIST

## 2025-01-20 PROCEDURE — 97116 GAIT TRAINING THERAPY: CPT | Mod: GP | Performed by: PHYSICAL THERAPIST

## 2025-01-20 NOTE — PROGRESS NOTES
Physical Therapy    Physical Therapy Treatment    Patient Name: Kiel Cantrell  MRN: [id number]  Encounter Date: 1/20/2025     Time Calculation  Start Time: 1502  Stop Time: 1555  Time Calculation (min): 53 min    Visit #: 2  out of 10  Insurance: 01/02/2025: NO AUTH, 96% COVERAGE, [place], [address] OOP, [employer] [facility]   Evaluation date: 1/6/2024    Current Problem:   1. Right ankle injury, initial encounter  Follow Up In Physical Therapy      2. High ankle sprain of right lower extremity, initial encounter  Follow Up In Physical Therapy        SUBJECTIVE:   The patient states that he has been working on ankle pumps, but other than than, has not been doing home program.  Voices reduced pain level.     Precautions: Precautions Comment: Per Bc Chu, pt now can be WBAT in boot as able, progressing to show as able         Pain:   Start of session: 3/10     OBJECTIVE:    Right ankle DF to 7 degrees.    Treatments:  Therapeutic Exercise: (20 minutes)  - SAQ with Ankle DF x 20 reps.  - Ankle Circles  in each direction.  - Seated ankle DF/PF on rocker board.  - Seated ankle DF/PF, with feet on ground, to tolerance.    Manual Therapy: (15 minutes)  Retrograde massage to Right LE  STM to gastroc.    Gait Training: (15 minutes)  With bilateral axillary crutches, with tennis shoes donned.  Pt ambulated 5 x 20 feet with focus on appropriate heel to toe gait pattern.  Pt demonstrates good ability to complete gait without boot, with crutches, and no pain increase.       HEP:  Access Code: [email or web address]  URL: [email or web address]  Date: 01/20/2025  Prepared by:   Exercises  - Supine Knee Extension Strengthening  - 2 x daily - 7 x weekly - 2 sets - 10 reps - 1 hold  - Supine Ankle Circles  - 2 x daily - 7 x weekly - 2 sets - 10 reps - 1 hold  - Seated Heel Raise  - 2 x daily - 7 x weekly - 2 sets - 10 reps - 1 hold  - Seated Toe Raise  - 2 x daily - 7 x weekly - 2 sets - 10 reps - 1 hold    ASSESSMENT:   Pt presents to clinic with boot  donned.  Most of the session was completed without boot in place, which patient tolerated well.  Crutches necessary to unweight right LE while walking without the boot.  ROM seems to be better than at eval.  Patient understands new HEP work to be completed.    Post session pain: No change     Plan  Treatment/Interventions: Dry needling, Electrical stimulation, Manual therapy, Neuromuscular re-education, Taping techniques, Therapeutic activities, Therapeutic exercises, Ultrasound  PT Plan: Skilled PT  PT Frequency: 2 times per week  Duration: 12 visits  Certification Period Start Date: 01/06/25  Certification Period End Date: 04/06/25  Number of Treatments Authorized: [place]  Rehab Potential: Good  Plan of Care Agreement: Patient     Insurance Plan: Payor: [employer] / Plan: [employer] PEREZ [employer]ICARE / Product Type: *No Product type* /      Plan for next visit: initiate AROM, intrinsic exercises, WB as able, manual/modalities PRN       Goals:  Active         PT Problem         PT Goal 1         Start:  01/06/25    Expected End:  02/20/25              PT Goal 2         Start:  01/06/25    Expected End:  04/06/25              PT Goal 3         Start:  01/06/25    Expected End:  04/06/25              PT Goal 4         Start:  01/06/25    Expected End:  04/06/25              PT Goal 5         Start:  01/06/25    Expected End:  04/06/25              Patient Stated Goal 1         Start:  01/06/25    Expected End:  04/06/25              Patient Stated Goal 2         Start:  01/06/25    Expected End:  04/06/25               STG, 6 visits:  Pt will be independent in HEP to improve R ankle and foot strength, ROM, and function.  Pt will perform household distance walking without assistive device, without significant deviation, and without pain.     Pt will improve R ankle pain-free ROM to full in all planes for improved body mechanics during functional mobility.  Pt will increase strength in R ankle by 1/2 MMT in all planes for  increased tolerance to functional activities.  Pt will maintain SLS on R LE for 10s with EO for improved ankle proprioception and stability.  Pt will ambulate long community distances across all surfaces without pain or limitation.  Pt will tolerate >1 hour of standing activity without onset of pain for improved tolerance to work as a .   Pt will ascend/descend 2 flights of stairs reciprocally without pain for improved performance of household and community mobility.

## 2025-01-23 ENCOUNTER — TREATMENT (OUTPATIENT)
Dept: PHYSICAL THERAPY | Facility: CLINIC | Age: 70
End: 2025-01-23
Payer: MEDICARE

## 2025-01-23 ENCOUNTER — OFFICE VISIT (OUTPATIENT)
Dept: CARDIOLOGY | Facility: CLINIC | Age: 70
End: 2025-01-23
Payer: MEDICARE

## 2025-01-23 VITALS
SYSTOLIC BLOOD PRESSURE: 132 MMHG | BODY MASS INDEX: 36.86 KG/M2 | OXYGEN SATURATION: 97 % | DIASTOLIC BLOOD PRESSURE: 78 MMHG | WEIGHT: 315 LBS | HEART RATE: 87 BPM

## 2025-01-23 DIAGNOSIS — I35.0 NONRHEUMATIC AORTIC (VALVE) STENOSIS: Primary | ICD-10-CM

## 2025-01-23 DIAGNOSIS — S99.911A RIGHT ANKLE INJURY, INITIAL ENCOUNTER: Primary | ICD-10-CM

## 2025-01-23 DIAGNOSIS — S93.491A HIGH ANKLE SPRAIN OF RIGHT LOWER EXTREMITY, INITIAL ENCOUNTER: ICD-10-CM

## 2025-01-23 DIAGNOSIS — I48.19 PERSISTENT ATRIAL FIBRILLATION (MULTI): ICD-10-CM

## 2025-01-23 PROCEDURE — 93005 ELECTROCARDIOGRAM TRACING: CPT | Performed by: INTERNAL MEDICINE

## 2025-01-23 PROCEDURE — 3078F DIAST BP <80 MM HG: CPT | Performed by: INTERNAL MEDICINE

## 2025-01-23 PROCEDURE — 1159F MED LIST DOCD IN RCRD: CPT | Performed by: INTERNAL MEDICINE

## 2025-01-23 PROCEDURE — 97116 GAIT TRAINING THERAPY: CPT | Mod: GP

## 2025-01-23 PROCEDURE — 99204 OFFICE O/P NEW MOD 45 MIN: CPT | Performed by: INTERNAL MEDICINE

## 2025-01-23 PROCEDURE — 1036F TOBACCO NON-USER: CPT | Performed by: INTERNAL MEDICINE

## 2025-01-23 PROCEDURE — 1126F AMNT PAIN NOTED NONE PRSNT: CPT | Performed by: INTERNAL MEDICINE

## 2025-01-23 PROCEDURE — 3075F SYST BP GE 130 - 139MM HG: CPT | Performed by: INTERNAL MEDICINE

## 2025-01-23 PROCEDURE — 97140 MANUAL THERAPY 1/> REGIONS: CPT | Mod: GP

## 2025-01-23 PROCEDURE — 97110 THERAPEUTIC EXERCISES: CPT | Mod: GP

## 2025-01-23 PROCEDURE — 1123F ACP DISCUSS/DSCN MKR DOCD: CPT | Performed by: INTERNAL MEDICINE

## 2025-01-23 PROCEDURE — 99214 OFFICE O/P EST MOD 30 MIN: CPT | Mod: 25 | Performed by: INTERNAL MEDICINE

## 2025-01-23 PROCEDURE — 93010 ELECTROCARDIOGRAM REPORT: CPT | Performed by: INTERNAL MEDICINE

## 2025-01-23 ASSESSMENT — LIFESTYLE VARIABLES: TOTAL SCORE: 0

## 2025-01-23 ASSESSMENT — ENCOUNTER SYMPTOMS
OCCASIONAL FEELINGS OF UNSTEADINESS: 0
DEPRESSION: 0
LOSS OF SENSATION IN FEET: 0

## 2025-01-23 ASSESSMENT — PAIN SCALES - GENERAL: PAINLEVEL_OUTOF10: 0-NO PAIN

## 2025-01-23 NOTE — ASSESSMENT & PLAN NOTE
Well-managed on present lisinopril.  Avoid excessive vasodilation in the setting of moderate aortic valve stenosis.

## 2025-01-23 NOTE — ASSESSMENT & PLAN NOTE
Echocardiogram in March 2024 in the setting of normal LV systolic function and aortic valve area 1.15 cm².  Tolerating well with no signs of volume overload.

## 2025-01-23 NOTE — ASSESSMENT & PLAN NOTE
EKG today 1/23/2025 confirms atrial fibrillation with good rate control and nonspecific ST-T changes suggesting that his atrial fibrillation is asymptomatic and persistent/chronic    Remain on Eliquis chronically for cardioembolic risk reduction.  Follow-up with me in 6 months.

## 2025-01-23 NOTE — PROGRESS NOTES
Physical Therapy Treatment    Patient Name: Kiel Cantrell  MRN: [id number]  Encounter date:  1/23/2025  Time Calculation  Start Time: 1400  Stop Time: 1450  Time Calculation (min): 50 min     PT Therapeutic Procedures Time Entry  Manual Therapy Time Entry: 10  Therapeutic Exercise Time Entry: 23  Gait Training Time Entry: 8       Visit Number:  3 (including evaluation)  Planned total visits: 12  Visits Authorized/Insurance Coverage:  01/02/2025: NO AUTH, 96% [place], [place], 1500 OOP, [employer] MCR     Current Problem  Problem List Items Addressed This Visit    None  Visit Diagnoses         Codes    Right ankle injury, initial encounter    -  Primary S99.911A    High ankle sprain of right lower extremity, initial encounter     S93.491A              Precautions  Precautions Comment: Per Bc Chu, pt now can be WBAT in boot as able, progressing to shoe as able    Pain  Lateral ankle  <1/10  Reports some paresthesia plantar surface of foot    Subjective  General  Patient reports compliance with HEP.  Patient ambulating in boot/crutches only when outdoors; otherwise using crutches and normal footwear without issues    Objective  Modest swelling right ankle      Treatments:  Boot removed  Therapeutic Exercise: (23 minutes)  Supine  -ankle pumps x 20  - SAQ with Ankle DF x 20 reps.  - Ankle Circles  in each direction x 20 reps  - SLR 2 x 10- some cramping  - Sidelying hip ABD - cramping    Seated  - Seated ankle DF/PF on rocker board.x 20 reps  - Seated ankle DF/PF, with feet on ground, to tolerance x 20       Manual Therapy: (10 minutes)  Retrograde massage to right lowe LE  STM to right  gastroc.     Gait Training: (8 minutes)  With bilateral axillary crutches, with tennis shoes donned.  Pt ambulated 40 x 6 feet with focus on appropriate heel to toe gait pattern.       HEP:  Access Code: [email or web address]  URL: [email or web address]  Date: 01/20/2025  Prepared by:   Exercises  - Supine Knee Extension Strengthening  - 2 x  daily - 7 x weekly - 2 sets - 10 reps - 1 hold  - Supine Ankle Circles  - 2 x daily - 7 x weekly - 2 sets - 10 reps - 1 hold  - Seated Heel Raise  - 2 x daily - 7 x weekly - 2 sets - 10 reps - 1 hold  - Seated Toe Raise  - 2 x daily - 7 x weekly - 2 sets - 10 reps - 1 hold     Issued 1/23/2025  SLR  Sideling hip ABD  Prone hip EXT    Has patient been compliant with HEP? Yes    Activity tolerance:  good    OP EDUCATION:  HEP  Crutches when up    Assessment:  Pt's response to treatment:  good - muscle cramping with SLR/hip ABD  Areas of improvements:  pain/swelling ROM/function  Limitations/deficits:  healing status/cramping    Pain end of session:   No changes right ankle    Plan:  Continue with current POC with the following changes advance as able/per orders    Assessment of current progress against goals:  Progressing toward functional goals; gait smooth and fluid with crutches/normal footwear.    Goals:  Active       PT Problem       STG, 6 visits:       Start:  01/06/25    Expected End:  02/20/25       Pt will be independent in HEP to improve R ankle and foot strength, ROM, and function.  Pt will perform household distance walking without assistive device, without significant deviation, and without pain.         Pt will improve R ankle pain-free ROM to full in all planes for improved body mechanics during functional mobility.        Start:  01/06/25    Expected End:  04/06/25            Pt will increase strength in R ankle by 1/2 MMT in all planes for increased tolerance to functional activities.        Start:  01/06/25    Expected End:  04/06/25            Pt will maintain SLS on R LE for 10s with EO for improved ankle proprioception and stability.        Start:  01/06/25    Expected End:  04/06/25            Pt will ambulate long community distances across all surfaces without pain or limitation.        Start:  01/06/25    Expected End:  04/06/25            Pt will tolerate >1 hour of standing activity without  onset of pain for improved tolerance to work as a .         Start:  01/06/25    Expected End:  04/06/25            Pt will ascend/descend 2 flights of stairs reciprocally without pain for improved performance of household and community mobility.        Start:  01/06/25    Expected End:  04/06/25

## 2025-01-23 NOTE — PROGRESS NOTES
Subjective      Chief Complaint   Patient presents with    Atrial Fibrillation        HPI     Review of Systems   All other systems reviewed and are negative.       Objective   Physical Exam  Constitutional:       Appearance: Normal appearance.   HENT:      Head: Normocephalic and atraumatic.   Eyes:      Pupils: Pupils are equal, round, and reactive to light.   Cardiovascular:      Rate and Rhythm: Normal rate and regular rhythm.      Pulses: Normal pulses.      Heart sounds: Murmur heard.      Comments: 2/6 systolic ejection murmur loudest at aortic position  Pulmonary:      Effort: Pulmonary effort is normal.      Breath sounds: Normal breath sounds.   Abdominal:      General: Abdomen is flat. Bowel sounds are normal.      Palpations: Abdomen is soft.   Musculoskeletal:         General: Normal range of motion.      Cervical back: Normal range of motion.      Comments: His right foot and ankle are in a boot immobilizer because of a spiral fracture of his tibia from a joint sprain   Skin:     General: Skin is warm and dry.   Neurological:      General: No focal deficit present.   Psychiatric:         Mood and Affect: Mood normal.         Judgment: Judgment normal.          Lab Review:   Not applicable    Nonrheumatic aortic (valve) stenosis  Echocardiogram in March 2024 in the setting of normal LV systolic function and aortic valve area 1.15 cm².  Tolerating well with no signs of volume overload.    Essential hypertension  Well-managed on present lisinopril.  Avoid excessive vasodilation in the setting of moderate aortic valve stenosis.    Paroxysmal atrial fibrillation (Multi)  EKG today 1/23/2025 confirms atrial fibrillation with good rate control and nonspecific ST-T changes suggesting that his atrial fibrillation is asymptomatic and persistent/chronic    Remain on Eliquis chronically for cardioembolic risk reduction.  Follow-up with me in 6 months.

## 2025-01-27 ENCOUNTER — APPOINTMENT (OUTPATIENT)
Dept: PHYSICAL THERAPY | Facility: CLINIC | Age: 70
End: 2025-01-27
Payer: MEDICARE

## 2025-01-27 DIAGNOSIS — S99.911A RIGHT ANKLE INJURY, INITIAL ENCOUNTER: ICD-10-CM

## 2025-01-27 DIAGNOSIS — S93.491A HIGH ANKLE SPRAIN OF RIGHT LOWER EXTREMITY, INITIAL ENCOUNTER: ICD-10-CM

## 2025-01-27 PROCEDURE — 97110 THERAPEUTIC EXERCISES: CPT | Mod: GP | Performed by: PHYSICAL THERAPIST

## 2025-01-27 PROCEDURE — 97116 GAIT TRAINING THERAPY: CPT | Mod: GP | Performed by: PHYSICAL THERAPIST

## 2025-01-27 NOTE — PROGRESS NOTES
Physical Therapy Treatment    Patient Name: Kiel Cantrell  MRN: [id number]  Encounter date:  1/27/2025  Time Calculation  Start Time: 1454  Stop Time: 1545  Time Calculation (min): 51 min             Visit Number:  4 (including evaluation)  Planned total visits: 12  Visits Authorized/Insurance Coverage:  01/02/2025: NO AUTH, 96% [place], 1500 OOP, [employer] MCR     Current Problem  Problem List Items Addressed This Visit    None  Visit Diagnoses         Codes    Right ankle injury, initial encounter     S99.911A    High ankle sprain of right lower extremity, initial encounter     S93.491A          Precautions  Precautions Comment: Per Bc Chu, pt now can be WBAT in boot as able, progressing to shoe as able    Pain  Lateral ankle  <1/10  Reports some paresthesia plantar surface of foot    Subjective  General  Patient reports still doing home program.  Feels like his swelling is getting better.  Has been cramping with some exercises.  When out of his home, he uses his boot.    Objective  Modest swelling right ankle    Treatments:  Boot removed  Therapeutic Exercise: ( 35 minutes)  Supine  - SAQ with Ankle DF versus 2 pounds 3 x 10 reps.  - Ankle Circles  in each direction x 20 reps  - SLR 3 x 10- no cramping    Hooklying  - Hip Abd against light blue level 4 band 3 x 10.  - Ankle DF versus green band level 3 x 10 reps.    Seated  - Seated ankle DF/PF on rocker board  x 2 minutes  - Seated ankle DF/PF, with feet on ground, to tolerance x 2 minutes  - LAQ with DF 3 x 10    In parallel bars:  - AMB FWD with fingertip support.  6 laps.  - Sidestepping with fingertip support 3 laps.    At 1/4 Wall:  Calf stretch on slant 3 x 30 seconds.    TechnoGym HS Stretching x 2 minutes each, to 60 degrees.       Gait Training: (10 minutes)  With bilateral axillary crutches, with tennis shoes donned.  Pt ambulated 40 x 6 feet with focus on appropriate heel to toe gait pattern.       HEP:  Access Code: [email or web address]  URL:  [email or web address]  Date: 01/20/2025  Prepared by:   Exercises  - Supine Knee Extension Strengthening  - 2 x daily - 7 x weekly - 2 sets - 10 reps - 1 hold  - Supine Ankle Circles  - 2 x daily - 7 x weekly - 2 sets - 10 reps - 1 hold  - Seated Heel Raise  - 2 x daily - 7 x weekly - 2 sets - 10 reps - 1 hold  - Seated Toe Raise  - 2 x daily - 7 x weekly - 2 sets - 10 reps - 1 hold     Issued 1/23/2025  SLR  Sideling hip ABD  Prone hip EXT    Has patient been compliant with HEP? Yes    Activity tolerance:  good    OP EDUCATION:  HEP  Crutches when up    Assessment:  Pt's response to treatment:  good - muscle cramping with SLR/hip ABD (as with previous session)  Areas of improvements:  pain/swelling ROM/function  Limitations/deficits:  healing status/cramping    Pain end of session:   No changes right ankle    Plan:  Continue with current POC with the following changes advance as able/per orders    Assessment of current progress against goals:  Progressing toward functional goals; gait smooth and fluid with crutches/normal footwear.    Goals:  Active       PT Problem       STG, 6 visits:       Start:  01/06/25    Expected End:  02/20/25       Pt will be independent in HEP to improve R ankle and foot strength, ROM, and function.  Pt will perform household distance walking without assistive device, without significant deviation, and without pain.         Pt will improve R ankle pain-free ROM to full in all planes for improved body mechanics during functional mobility.        Start:  01/06/25    Expected End:  04/06/25            Pt will increase strength in R ankle by 1/2 MMT in all planes for increased tolerance to functional activities.        Start:  01/06/25    Expected End:  04/06/25            Pt will maintain SLS on R LE for 10s with EO for improved ankle proprioception and stability.        Start:  01/06/25    Expected End:  04/06/25            Pt will ambulate long community distances across all  surfaces without pain or limitation.        Start:  01/06/25    Expected End:  04/06/25            Pt will tolerate >1 hour of standing activity without onset of pain for improved tolerance to work as a .         Start:  01/06/25    Expected End:  04/06/25            Pt will ascend/descend 2 flights of stairs reciprocally without pain for improved performance of household and community mobility.        Start:  01/06/25    Expected End:  04/06/25

## 2025-01-30 ENCOUNTER — TREATMENT (OUTPATIENT)
Dept: PHYSICAL THERAPY | Facility: CLINIC | Age: 70
End: 2025-01-30
Payer: MEDICARE

## 2025-01-30 DIAGNOSIS — S93.491A HIGH ANKLE SPRAIN OF RIGHT LOWER EXTREMITY, INITIAL ENCOUNTER: ICD-10-CM

## 2025-01-30 DIAGNOSIS — S99.911A RIGHT ANKLE INJURY, INITIAL ENCOUNTER: ICD-10-CM

## 2025-01-30 PROCEDURE — 97110 THERAPEUTIC EXERCISES: CPT | Mod: GP | Performed by: PHYSICAL THERAPIST

## 2025-01-30 NOTE — PROGRESS NOTES
Physical Therapy Treatment    Patient Name: Kiel Cantrell  MRN: [id number]  Encounter date:  1/30/2025  Time Calculation  Start Time: 1115  Stop Time: 1205  Time Calculation (min): 50 min     PT Therapeutic Procedures Time Entry  Therapeutic Exercise Time Entry: 40       Visit Number:  5 (including evaluation)  Planned total visits: 12  Visits Authorized/Insurance Coverage:  01/02/2025: NO AUTH, 96% [place], [place] OOP, [employer] MCR     Current Problem  Problem List Items Addressed This Visit    None  Visit Diagnoses         Codes    Right ankle injury, initial encounter     S99.911A    High ankle sprain of right lower extremity, initial encounter     S93.491A          Precautions  Precautions Comment: Per Bc Chu, pt now can be WBAT in boot as able, progressing to shoe as able    Pain  Lateral ankle  0/10  Reports some paresthesia plantar surface of foot    Subjective  General  Patient reports still doing home program.  Feels like his swelling is still getting better.  Has not been cramping with exercises.  When out of his home, he uses his boot.    Objective  Modest swelling right ankle    HHD Ankle DF  Right 50, 51     Left 62, 69    Treatments:  Boot removed  Therapeutic Exercise: ( 40 minutes)  Supine  - SAQ with Ankle DF versus midnight blue level 5 band   3 x 10 reps.  - Ankle Circles  in each direction x 20 reps  - SLR 3 x 10- no cramping    Hooklying  - Hip Abd against midnight blue level 5 band    3 x 10.  - Ankle DF versus midnight blue band level 4 x 10 reps.    Seated  - Seated ankle DF/PF on rocker board  x 2 minutes  - Seated ankle DF/PF, with feet on ground, to tolerance x 2 minutes  - LAQ with DF 3 x 10    In parallel bars:  - AMB FWD with fingertip support.  6 laps.  - Sidestepping with fingertip support 3 laps.    At 1/4 Wall:  Calf stretch on slant 3 x 30 seconds.    TechnoGym HS Stretching x 2 minutes each, to 60 degrees.     Gait Training: (5 minutes)  With bilateral axillary crutches,  with tennis shoes donned.  Pt ambulated 40 x 6 feet with focus on appropriate heel to toe gait pattern.    HEP:  Access Code: [email or web address]  URL: [email or web address]  Date: 01/20/2025  Prepared by:   Exercises  - Supine Knee Extension Strengthening  - 2 x daily - 7 x weekly - 2 sets - 10 reps - 1 hold  - Supine Ankle Circles  - 2 x daily - 7 x weekly - 2 sets - 10 reps - 1 hold  - Seated Heel Raise  - 2 x daily - 7 x weekly - 2 sets - 10 reps - 1 hold  - Seated Toe Raise  - 2 x daily - 7 x weekly - 2 sets - 10 reps - 1 hold     Issued 1/23/2025  SLR  Sideling hip ABD  Prone hip EXT    Has patient been compliant with HEP? Yes    Activity tolerance:  good    OP EDUCATION:  HEP  Crutches when up    Assessment:  Patient doing very well with gait quality using crutches.  Pt's response to treatment:  good - muscle cramping with SLR/hip ABD (as with previous session)  Areas of improvements:  pain/swelling ROM/function  Limitations/deficits:  healing status/cramping    Pain end of session:   No changes right ankle    Plan:  Continue with current POC with the following changes advance as able/per orders    Assessment of current progress against goals:  Progressing toward functional goals; gait smooth and fluid with crutches/normal footwear.    Goals:  Active       PT Problem       STG, 6 visits:       Start:  01/06/25    Expected End:  02/20/25       Pt will be independent in [facility] to improve R ankle and foot strength, ROM, and function.  Pt will perform household distance walking without assistive device, without significant deviation, and without pain.         Pt will improve R ankle pain-free ROM to full in all planes for improved body mechanics during functional mobility.        Start:  01/06/25    Expected End:  04/06/25            Pt will increase strength in R ankle by 1/2 MMT in all planes for increased tolerance to functional activities.        Start:  01/06/25    Expected End:  04/06/25            Pt will  maintain SLS on R LE for 10s with EO for improved ankle proprioception and stability.        Start:  01/06/25    Expected End:  04/06/25            Pt will ambulate long community distances across all surfaces without pain or limitation.        Start:  01/06/25    Expected End:  04/06/25            Pt will tolerate >1 hour of standing activity without onset of pain for improved tolerance to work as a .         Start:  01/06/25    Expected End:  04/06/25            Pt will ascend/descend 2 flights of stairs reciprocally without pain for improved performance of household and community mobility.        Start:  01/06/25    Expected End:  04/06/25

## 2025-02-03 ENCOUNTER — TREATMENT (OUTPATIENT)
Dept: PHYSICAL THERAPY | Facility: CLINIC | Age: 70
End: 2025-02-03
Payer: MEDICARE

## 2025-02-03 DIAGNOSIS — S99.911A RIGHT ANKLE INJURY, INITIAL ENCOUNTER: ICD-10-CM

## 2025-02-03 DIAGNOSIS — S93.491A HIGH ANKLE SPRAIN OF RIGHT LOWER EXTREMITY, INITIAL ENCOUNTER: ICD-10-CM

## 2025-02-03 PROCEDURE — 97110 THERAPEUTIC EXERCISES: CPT | Mod: GP | Performed by: PHYSICAL THERAPIST

## 2025-02-03 NOTE — PROGRESS NOTES
Physical Therapy Treatment    Patient Name: Kiel Cantrell  MRN: [id number]  Encounter date:  2/3/2025  Time Calculation  Start Time: 1505  Stop Time: 1552  Time Calculation (min): 47 min     PT Therapeutic Procedures Time Entry  Therapeutic Exercise Time Entry: 40  Gait Training Time Entry: 5       Visit Number:  6 (including evaluation)  Planned total visits: 12  Visits Authorized/Insurance Coverage:  01/02/2025: NO AUTH, 96% [place], 1500 OOP, [employer] MCR     Current Problem  Problem List Items Addressed This Visit    None  Visit Diagnoses         Codes    Right ankle injury, initial encounter     S99.911A    High ankle sprain of right lower extremity, initial encounter     S93.491A          Precautions  Precautions Comment: Per Bc Chu, pt now can be WBAT in boot as able, progressing to shoe as able    Pain  Lateral ankle  0/10  Reports some paresthesia plantar surface of foot    Subjective  General  Patient reports still doing home program.  Does not feel pain when walking without boot donned at home.  Feeling more comfortable with his balance.    Objective  Minimal swelling right ankle    1/30/25  HHD Ankle DF  Right 50, 51     Left 62, 69    Treatments:  Boot removed  Therapeutic Exercise: ( 40 minutes)  Supine  - Ankle Circles  in each direction x 20 reps  - SLR 3 x 10- no cramping    Hooklying  - Hip Abd against midnight blue level 5 band    3 x 10.  - Ankle DF versus midnight blue band level 4 x 10 reps.    Seated  - Seated ankle DF/PF on rocker board  x 2 minutes  - Seated ankle DF/PF, with feet on ground, to tolerance x 2 minutes  - LAQ with DF against midnight blue band level 5 3 x 10    In parallel bars:  - AMB FWD with fingertip support.  6 laps.  - Sidestepping with fingertip support 3 laps.  - Mini Lunge on BOSU x 20 each.    At 1/4 Wall:  Calf stretch on slant 3 x 30 seconds.    TechnoGym HS Stretching x 2 minutes each, to 60 degrees.     Gait Training: (5 minutes)  With bilateral axillary  crutches, with tennis shoes donned.  Pt ambulated 40 x 6 feet with focus on appropriate heel to toe gait pattern.    HEP:  Access Code: [email or web address]  URL: [email or web address]  Date: 01/20/2025  Prepared by:   Exercises  - Supine Knee Extension Strengthening  - 2 x daily - 7 x weekly - 2 sets - 10 reps - 1 hold  - Supine Ankle Circles  - 2 x daily - 7 x weekly - 2 sets - 10 reps - 1 hold  - Seated Heel Raise  - 2 x daily - 7 x weekly - 2 sets - 10 reps - 1 hold  - Seated Toe Raise  - 2 x daily - 7 x weekly - 2 sets - 10 reps - 1 hold     Issued 1/23/2025  SLR  Sideling hip ABD  Prone hip EXT    Has patient been compliant with HEP? Yes    Activity tolerance:  good    OP EDUCATION:  HEP  Crutches when up    Assessment:  Patient doing very well with gait quality using crutches.  Able to verbalize need to complete heel to toe pattern.  Pt's response to treatment:  good - no cramping recently.  Areas of improvements:  pain/swelling/ ROM/function  Limitations/deficits:  healing status    Pain end of session:   No changes right ankle    Plan:  Continue with current POC with the following changes advance as able/per orders    Assessment of current progress against goals:  Progressing toward functional goals; gait smooth and fluid with crutches/normal footwear.    Goals:  Active       PT Problem       STG, 6 visits:       Start:  01/06/25    Expected End:  02/20/25       Pt will be independent in HEP to improve R ankle and foot strength, ROM, and function.  Pt will perform household distance walking without assistive device, without significant deviation, and without pain.         Pt will improve R ankle pain-free ROM to full in all planes for improved body mechanics during functional mobility.        Start:  01/06/25    Expected End:  04/06/25            Pt will increase strength in R ankle by 1/2 MMT in all planes for increased tolerance to functional activities.        Start:  01/06/25    Expected End:  04/06/25             Pt will maintain SLS on R LE for 10s with EO for improved ankle proprioception and stability.        Start:  01/06/25    Expected End:  04/06/25            Pt will ambulate long community distances across all surfaces without pain or limitation.        Start:  01/06/25    Expected End:  04/06/25            Pt will tolerate >1 hour of standing activity without onset of pain for improved tolerance to work as a .         Start:  01/06/25    Expected End:  04/06/25            Pt will ascend/descend 2 flights of stairs reciprocally without pain for improved performance of household and community mobility.        Start:  01/06/25    Expected End:  04/06/25

## 2025-02-05 NOTE — PROGRESS NOTES
Physical Therapy Treatment    Patient Name: Kiel Cantrell  MRN: [id number]  Encounter date:  2/6/2025  Time Calculation  Start Time: 1116  Stop Time: 1159  Time Calculation (min): 43 min     PT Therapeutic Procedures Time Entry  Therapeutic Exercise Time Entry: 43       Visit Number:  7 (including evaluation)  Planned total visits:  12  Visits Authorized/Insurance Coverage:  NO AUTH, 96% [place], 1500 OOP, [employer] MCR     Current Problem  Problem List Items Addressed This Visit    None  Visit Diagnoses         Codes    Right ankle injury, initial encounter     S99.911A    High ankle sprain of right lower extremity, initial encounter     S93.491A            Precautions  Precautions  Precautions Comment: Per Bc Chu, pt now can be WBAT in boot as able, progressing to show as able    Pain  Pain Assessment: 0-10  0-10 (Numeric) Pain Score: 1    Subjective  Ankle continues to improve, pain is typically low but fatigue present with prolonged time on his feet. Pt is consistent with HEP.     Objective  Decreased R heel strike and toe off with gait    Treatment:  Boot removed  Therapeutic Exercise:  In parallel bars:  - AMB FWD with fingertip support.  6 laps.  - AMB FWD with fingertip support.  6 laps.  - Sidestepping with fingertip support 3 laps.  - MoFlex calf stretch 3x30  - Rocker board AP, s/s x15 ea     Technogym leg press 3x10 20#    Seated  - Wobble board AP, s/s, cw, ccw x10 ea       TechnoGym HS Stretching x 2 minutes each, to 60 degrees.    Current HEP:  Access Code: [email or web address]  URL: [email or web address]  Date: 01/20/2025  Prepared by:   Exercises  - Supine Knee Extension Strengthening  - 2 x daily - 7 x weekly - 2 sets - 10 reps - 1 hold  - Supine Ankle Circles  - 2 x daily - 7 x weekly - 2 sets - 10 reps - 1 hold  - Seated Heel Raise  - 2 x daily - 7 x weekly - 2 sets - 10 reps - 1 hold  - Seated Toe Raise  - 2 x daily - 7 x weekly - 2 sets - 10 reps - 1 hold     Issued 1/23/2025  SLR  Sideling  hip ABD  Prone hip EXT    Has patient been consistent with performance of HEP? Yes    Assessment:  Pt's response to treatment:  Standing exercise progressed as above. Pt felt intermittent fatigue but no significant pain within visit. Pt still has difficulty with heel raise motion, tried on leg press but pt too tall for machine. Will work to progress calf strength next visit.   Areas of improvements:  pain levels   Limitations/deficits:  gait deviations     Pain end of session: 0/10    Plan:     Continue with current POC/no changes    Assessment of current progress against goals:  Progressing toward functional goals    Goals:  Active       PT Problem       STG, 6 visits:       Start:  01/06/25    Expected End:  02/20/25       Pt will be independent in HEP to improve R ankle and foot strength, ROM, and function.  Pt will perform household distance walking without assistive device, without significant deviation, and without pain.         Pt will improve R ankle pain-free ROM to full in all planes for improved body mechanics during functional mobility.        Start:  01/06/25    Expected End:  04/06/25            Pt will increase strength in R ankle by 1/2 MMT in all planes for increased tolerance to functional activities.        Start:  01/06/25    Expected End:  04/06/25            Pt will maintain SLS on R LE for 10s with EO for improved ankle proprioception and stability.        Start:  01/06/25    Expected End:  04/06/25            Pt will ambulate long community distances across all surfaces without pain or limitation.        Start:  01/06/25    Expected End:  04/06/25            Pt will tolerate >1 hour of standing activity without onset of pain for improved tolerance to work as a .         Start:  01/06/25    Expected End:  04/06/25            Pt will ascend/descend 2 flights of stairs reciprocally without pain for improved performance of household and community mobility.        Start:  01/06/25     Expected End:  04/06/25

## 2025-02-06 ENCOUNTER — TREATMENT (OUTPATIENT)
Dept: PHYSICAL THERAPY | Facility: CLINIC | Age: 70
End: 2025-02-06
Payer: MEDICARE

## 2025-02-06 DIAGNOSIS — S99.911A RIGHT ANKLE INJURY, INITIAL ENCOUNTER: ICD-10-CM

## 2025-02-06 DIAGNOSIS — S93.491A HIGH ANKLE SPRAIN OF RIGHT LOWER EXTREMITY, INITIAL ENCOUNTER: ICD-10-CM

## 2025-02-06 PROCEDURE — 97110 THERAPEUTIC EXERCISES: CPT | Mod: GP

## 2025-02-06 ASSESSMENT — PAIN - FUNCTIONAL ASSESSMENT: PAIN_FUNCTIONAL_ASSESSMENT: 0-10

## 2025-02-06 ASSESSMENT — PAIN SCALES - GENERAL: PAINLEVEL_OUTOF10: 1

## 2025-02-07 NOTE — PROGRESS NOTES
"Verbal consent of the patient and/or verbal parental consent for patients under the age of 18 have been obtained to conduct a physical examination at this office visit.    Established patient  History Of Present Illness  02/10/25 Kiel Cantrell is a 69 y.o. male who presents for a follow up of their Right ankle fracture s/p 10 weeks tall XP walking boot and crutches. Patient reports he feels his RIGHT ankle is improving, noting his RIGHT lower extremity is \"getting stronger\" and he is able to put more weight on it during PT. Patient states he has been compliant with the HEP provided at therapy, noting he uses a kitchen counter for support as needed. Patient states he has been decreasing crutch use, but is still dependant on them to decrease the weight and pressure on the RIGHT lower extremity. Patient came into the appointment without the walking boot on, stating he had PT this morning and had a Re-xray of the RIGHT ankle prior to the appointment and wanted to avoid the inconvenience of taking the boot of multiple times. Patient relays he has been walking short distances around his house like going to the bathroom without the boot alternating between crutch or counter support, stating he wears the boot and uses crutches for partial weight bearing as tolerated in public. Patient denies RIGHT ankle pain at the time of the appointment, reporting brief \"stinging\" intermittently throughout the day and relaying the weight of a shoe can cause pain if he is sitting with his RIGHT leg elevated with his RIGHT foot hanging off the edge of the foot rest. Patient relays the pain is resolved with ankle movement. Patient denies using NSAIDs or Tylenol or other interventions to reduce the pain and symptoms. Patient states he has been taking the calcium and vitamin D supplements as recommended and a supplement recommended by his varicose vein specialist Dr. Richmond at the last appointment.  I told him based off of his x-ray results " "if he is going to be going and walking long periods of ways are outside he should still be wearing the walking boot and crutches.  However if he has to get up to go to the bathroom off of the couch it is okay for him to go without the walking boot.    All previous Progress Notes and imaging results related to this patients chief complaint have been reviewed in preparation for this examination.    Past Medical History  He has a past medical history of Acute deep vein thrombosis (DVT) of calf muscle vein of left lower extremity (Multi) (11/08/2023), HL (hearing loss), Unspecified atrial fibrillation (Multi) (03/27/2014), and Visual impairment.    Surgical History  He has a past surgical history that includes Vasectomy and Eye surgery.     Social History  He reports that he has never smoked. He has never been exposed to tobacco smoke. He has never used smokeless tobacco. He reports current alcohol use of about 3.0 standard drinks of alcohol per week. He reports that he does not use drugs.    Family History  Family History   Problem Relation Name Age of Onset    Early natural death Mother Nara Cantrell     Miscarriages / Stillbirths Mother Nara Cantrell     Lung disease Father Nolan Cantrell     COPD Father Nolan Cantrell     No Known Problems Sister      No Known Problems Daughter      No Known Problems Son      Cancer Paternal Grandmother Supriya Cantrell      History Of Present Illness  12/9/24 Kiel Cantrell is a 69 y.o. male presenting with RIGHT ankle pain. Patient reports he fell on ice in the driveway last Tuesday and the R ankle was \"locked in place\" and could not move, and was forced in to inversion. Patient stated his R knee hurt more than his ankle at first, but he first noticed the ankle pain when he stood on it when he got up. Patient relays he attempted to move his R ankle to relieve the pain as he had with previous ankle injuries noting it did not decrease.  Patient went to an urgent care location 12/8/24, had " "X-rays, and was provided a walking boot. Patient has been using crutches he had at home for a past knee surgery. Patient came into the appointment without the walking boot on because it is too painful due to swelling. Patient reports 3/10 at rest, noting it was 8/10 at the time of the injury. Patient states the pain has been gradually improving, but still present with weight bearing.  We reviewed patient x-ray results in detail.  Patient x-rays show a spiral fracture of the distal fibula as well as significant swelling.  Upon exam patient has indications of a high ankle sprain particularly over the syndesmotic ligaments as well as medial and lateral ankle sprain and marked tenderness over the talus with concern for occult fracture.  As such after discussion due to patient's physical exam and due to the nature of the injury we will order an MRI to evaluate for underlying soft tissue injury and concern for need for referral to orthopedic surgery for syndesmotic repair.  We will have patient continue with his walking boot and will be nonweightbearing in a pair of crutches.  Patient is to not drive.  We can follow-up after MRI and adjust treatment as indicated at that time.  Patient and spouse verbalized understanding and agreement with plan of care.  ----------------------------------------------------------  12/23/24 Kiel Cantrell is a 69 y.o. male presenting for his MRI follow up of his RIGHT ankle. Since last visit in office, patient states that he feels improved. Presents using crutches and wearing tall walking boot as previously instructed. Rates current pain as a 1/10 that is \"every so often\".  We reviewed patient MRI results in detail.  Patient and spouse verbalized understanding of results.  We discussed treatment options and will have patient continue with walking boot and crutches as previously prescribed as well as physical therapy.  Patient will also continue with calcium and vitamin D as previously " recommended.  We can reevaluate in 4 weeks for mery-ray and adjust treatment as indicated at that time.  Patient does not warrant surgery at this time but we will reevaluate at a future appointment and if patient continues to have significant pain after completing therapy and complete resolution of his fracture we can consider referral to surgery for reevaluation.  Patient and spouse verbalized understanding and agreement with plan of care.   ----------------------------------------------------------  01/14/25 Kiel Cantrell is a 69 y.o. male who presents for a follow up of their Right ankle fracture s/p 6 weeks tall XP walking boot and crutches.  States that he is having 1/10 pain today with prolonged walking.  Still has a significant amount of swelling in his lower legs both legs obviously the 1 that fractured has some more swelling.  X-ray shows that sclerosis is noted however he does have significant varicose veins and venous insufficiency so we will get him into see Dr. Richmond.  Also stressed to him the importance of continuing to take his calcium as well as his vitamin D and the supplement  Symptum to help with bony healing.  I told him he should should still  use crutches while in the walking boot and additionally gave him an even up to take pressure off of his knees.  Additionally we discussed his MRI once again and all of the ligaments and tendons that he injured.. He was seen in by Mikel Estrada in [place]. No new or worsening symptoms.  We also talked about regenerative injections in the future if needed.        Allergies  Cat dander    Review of Systems  CONSTITUTIONAL:   Negative for weight change, loss of appetite, fatigue, weakness, fever, chills, night sweats, headaches .           HEENT:   Negative for cold, cough, sore throat, sinus pain, swollen lymph nodes.           OPHTHALMOLOGY:   Negative for diminished vision, blurred vision, loss of vision, double vision.           ALLERGY:   Negative for runny  nose, scratchy throat, sinus congestion, rash, facial pressure, nasal congestion, post-nasal drip.           CARDIOLOGY:   Negative for chest pain, palpitations, murmurs, irregular heart beat, shortness of breath, leg edema, dyspnea on exertion, fatigue, dizziness.           RESPIRATORY:   Negative for chest pain, shortness of breath, swelling of the legs, asthma/copd, chest congestion, pain with breathing .           GASTROENTEROLOGY:   Negative for nausea, vomitting, heartburn, constipation, diarrhea, blood in stool, change in bowel habits, black stool.           HEMATOLOGY/LYMPH:   Negative for fatigue, loss of appetitie, easy bruising, easy bleeding, anemia, abnormal bleeding, slow healing.           ENDOCRINOLOGY:   Negative for polyuria, polydipsia, polyphagia, fatigue, weight loss, weight gain, cold intolerance, heat intolerance, diabetes.           MUSCULOSKELETAL:   Positive  for Right ankle        DERMATOLOGY:   Negative for rash, bruising.           NEUROLOGY:   Negative for tingling, numbness, gait abnormality, paresthesias, weakness, sciatica.        Examination:    Right Ankle and Foot  Edema: Positive   Ecchymosis/Bruising: Positive  Percussion Test: Positive     distal fibula      Tuning Fork Test: Negative     Orientation:   Positive  Asymmetrical, because of RIGHT ankle swelling.          ROM:   Positive, still some pain with plantarflexion and dorsiflexion combined with inversion and eversion however full range of motion           Muscle Strength: Positive slight pain with testing however no deficit in  strength anymore  +5/+5 Planar Flexion,  +5/+5  Dorsi Flexion    +5/+5 Inversion   +5/+5 Eversion      +5/+5 Plantarflexion in combination with inversion  +5/+5 Plantarflexion in combination with eversion         +5/+5 Dorsiflexion in combination with inversion (Posterior Tibialis)  +5/+5 Dorsiflexion in combination with eversion (Peroneal Brevis)   +5/+5 Dorsiflexion in combination with eversion  and flexion of great toe (Peroneal Longus).      Palpation:   Positive, slight tender to Palpation Right lateral greater than medial malleolus, as well as distal fibula and over the ATF and CF ligament and anterior inferior tib-fib ligament           Vascular:   +2/+4 Dorsalis Pedis  +2/+4 Posterior Tibialis  Capillary Refill < 2 Seconds.        Leg/Ankle/Foot - Ankle Impingement:  Posterior Ankle Impingement Test: Negative.   Anterior Ankle Impingement Test: Negative.          Leg/Ankle/Foot - Ankle Instability:  Flexibility Test: Negative.  Anterior Drawer Test:   Positive.  Improving  Talar Tilt Test:  Negative.   External Rotation Kleiger Plantar Flexion Test: Positive improving  External Rotation Kleiger Dorsiflexion Test:  Positive improving  Squeeze Test:  Positive still causes some pain  Dorsiflexion Test:  Negative.   Posterior Tibial Instability Test: Negative.  Peroneal Tendon Instability Test:  Negative.  Horizontal Squeeze Test:  Positive.  Still causes some pain  Vertical Squeeze Test: Negative.  Like that back brace better  Standing/Walking Test:  Positive, Painful.          Leg/Ankle/Foot - DVT:  Maikol's Sign: Negative.          Leg/Ankle/Foot - Forefoot:  Strunsky Test:  Negative.   Gaenslen Maneuver Test: Negative.   Metatarsal Tap Test: Negative  Crepitation Test: Negative.          Leg/Ankle/Foot - Hindfoot Achilles/Calcaneus:  Salazar Compression Test: Negative.   Hoffa Sign: Negative.   Achilles Tendon Tap Test: Negative.   Heel Compression Test: Negative.          Leg/Ankle/Foot - Nerve Irritation:  Sahara Sign: Negative.   Digital Nerve Stretch Test: Negative.   Tinel Sign: Negative.   Tourniquet Sign: Negative.          Feet/Foot:   Positive BILATERAL  Valgus foot      Imaging and Diagnostics Review:  New x-rays of the right foot and ankle ordered for my personal over read shows bony sclerosis with healing noticed in good  alignment  -------------------------------------------------------------------------------  Interpreted By:  lEvira Pineda and Stephens Katherine   STUDY: MRI of the right ankle without IV contrast;  12/19/2024 9:05 am      INDICATION: Signs/Symptoms:RIGHT ANKLE PAIN.      COMPARISON: None.      ACCESSION NUMBER(S): [id number]      ORDERING CLINICIAN: APRIL DRIVER      TECHNIQUE: MR imaging of the  right ankle was obtained  without administration  of intravenous contrast medium.      FINDINGS:  TENDONS:  The extensor tendons are intact and demonstrate a normal course. Mild tendinosis and tenosynovitis of the posterior tibial and flexor  hallucis longus tendons. Moderate tendinosis of the peroneus longus tendon. Split tear of the peroneus brevis tendon with distal  reconstitution. The Achilles tendon is intact with mild tendinosis.  The plantar aponeurosis is intact.      LIGAMENTS:  High-grade partial-thickness tear of the anterior inferior tibiofibular ligament with increased attenuation of the central  fibers. Nonvisualization of the anterior talofibular ligament consistent with tear. High-grade sprain of the calcaneofibular  ligament. The posterior talofibular ligament is intact. The posterior tibiofibular ligament is intact. The deep and superficial components  of the deltoid ligament are intact. The spring ligament is intact.      JOINTS:  There is no dislocation. There is no joint effusion. The articular cartilage of the ankle joint is normal. There is no osteochondral  defect in the talar dome.      OSSEOUS STRUCTURES:  Re-demonstration of a subacute nondisplaced distal fibular fracture.  There is no marrow replacing lesion. A benign cyst is noted in the lateral cuneiform.      SOFT TISSUES:  There is no muscle atrophy or tear.  The tarsal tunnel is normal.  The sinus tarsi is normal with preservation of fat signal.      IMPRESSION MRI of the right ankle December 19, 2024:  1. Nonvisualization of  the anterior talofibular ligament consistent with complete tear.  2. High-grade partial-thickness tear of the anterior inferior tibiofibular ligament.  3. Re-demonstration of subacute nondisplaced distal fibular fracture.  4. High-grade sprain of the calcaneofibular ligament.  5. Split tear of the peroneus brevis tendon with distal reconstitution.  6. Moderate peroneus longus, mild posterior tibial and flexor hallucis longus tendinosis.  7. Achilles tendon is intact with mild tendinosis.        I personally reviewed the images/study and I agree with Noemi Freeman DO's (radiology resident) findings as stated. This study  was interpreted at [place], [place].      MACRO: None      Signed by: Elvira Pineda 12/19/2024 5:31 PM  Dictation workstation:   [id number]    Assessment   1. Complete tear of ligament of ankle  XR ankle right 3+ views      2. Partial tear of ligament of lateral aspect of ankle  XR ankle right 3+ views      3. Nondisplaced comminuted fracture of shaft of left fibula, subsequent encounter for closed fracture with routine healing  XR ankle right 3+ views      4. High ankle sprain, right, subsequent encounter  XR ankle right 3+ views      5. Peroneus brevis tendinitis, right  XR ankle right 3+ views      6. Peroneus longus tendinitis, right  XR ankle right 3+ views      7. Right ankle injury, subsequent encounter  XR ankle right 3+ views      8. Sprain of right ankle, subsequent encounter  XR ankle right 3+ views      9. Asymptomatic varicose veins  XR ankle right 3+ views      10. Achilles tendinosis of right lower extremity  XR ankle right 3+ views      11. Right ankle injury, initial encounter  XR ankle right 3+ views        Treatment or Intervention:  May continue PRICE therapy as needed.  Continue physical Therapy 1-2 times a week for 8-10 weeks with manual therapy as well as dry needling and IASTM  Again stressed the importance of wearing  shoes with good stability control to help with the biomechanics affecting the lower legs  Again stressed the importance of wearing full foot insoles to help with the biomechanics affecting the lower legs  Continue over-the-counter vitamin-D3 8633-6029+ units a day with food as well as a daily multivitamin  Continue over-the-counter Move Free for joint health  May continue to take OTC Tylenol Extra Strength or OTC Tylenol Arthritis, taking one every 6-8 hours with food as needed for pain management.  Patient advised regarding the risk and/or potential adverse reactions and/or side effects of any prescribed medications along with any over-the-counter medications or any supplements used. Patient advised to seek immediate medical care if any adverse reactions occur. The patient and/or patient(s) parent(s) verbalized their understanding.  Continue even up to balance out to take away knee and hip issues  Continue to use crutches and tall XP walking boot nonweight bearing   Continue follow-ups with Dr. Richmond for varicose veins and venous insufficiency  Possibility regenerative injections in the future for his ankle ligaments  Follow up in 6 weeks for mery-ray reevaluation or sooner if needed     Please note that this report has been produced using speech recognition software.  It may contain errors related to grammar, punctuation or spelling.  Electronically signed, but not reviewed.  Gal Su D.O. ESVIN, Director of Sports Medicine    LON CASE on 2/10/25 at 4:45 PM.     ESVIN Jett DO

## 2025-02-10 ENCOUNTER — OFFICE VISIT (OUTPATIENT)
Dept: SPORTS MEDICINE | Facility: CLINIC | Age: 70
End: 2025-02-10
Payer: MEDICARE

## 2025-02-10 ENCOUNTER — TREATMENT (OUTPATIENT)
Dept: PHYSICAL THERAPY | Facility: CLINIC | Age: 70
End: 2025-02-10
Payer: MEDICARE

## 2025-02-10 ENCOUNTER — HOSPITAL ENCOUNTER (OUTPATIENT)
Dept: RADIOLOGY | Facility: CLINIC | Age: 70
Discharge: HOME | End: 2025-02-10
Payer: MEDICARE

## 2025-02-10 VITALS
HEIGHT: 78 IN | SYSTOLIC BLOOD PRESSURE: 118 MMHG | WEIGHT: 315 LBS | BODY MASS INDEX: 36.45 KG/M2 | DIASTOLIC BLOOD PRESSURE: 64 MMHG | HEART RATE: 70 BPM

## 2025-02-10 DIAGNOSIS — S93.491A HIGH ANKLE SPRAIN OF RIGHT LOWER EXTREMITY, INITIAL ENCOUNTER: ICD-10-CM

## 2025-02-10 DIAGNOSIS — M76.71 PERONEUS LONGUS TENDINITIS, RIGHT: ICD-10-CM

## 2025-02-10 DIAGNOSIS — S93.409A COMPLETE TEAR OF LIGAMENT OF ANKLE: ICD-10-CM

## 2025-02-10 DIAGNOSIS — S93.401D SPRAIN OF RIGHT ANKLE, SUBSEQUENT ENCOUNTER: ICD-10-CM

## 2025-02-10 DIAGNOSIS — S82.455D NONDISPLACED COMMINUTED FRACTURE OF SHAFT OF LEFT FIBULA, SUBSEQUENT ENCOUNTER FOR CLOSED FRACTURE WITH ROUTINE HEALING: ICD-10-CM

## 2025-02-10 DIAGNOSIS — M76.71 PERONEUS BREVIS TENDINITIS, RIGHT: ICD-10-CM

## 2025-02-10 DIAGNOSIS — I83.90 ASYMPTOMATIC VARICOSE VEINS: ICD-10-CM

## 2025-02-10 DIAGNOSIS — S93.491D HIGH ANKLE SPRAIN, RIGHT, SUBSEQUENT ENCOUNTER: ICD-10-CM

## 2025-02-10 DIAGNOSIS — S99.911D RIGHT ANKLE INJURY, SUBSEQUENT ENCOUNTER: ICD-10-CM

## 2025-02-10 DIAGNOSIS — S99.911A RIGHT ANKLE INJURY, INITIAL ENCOUNTER: ICD-10-CM

## 2025-02-10 DIAGNOSIS — S93.499A PARTIAL TEAR OF LIGAMENT OF LATERAL ASPECT OF ANKLE: ICD-10-CM

## 2025-02-10 DIAGNOSIS — M67.88 ACHILLES TENDINOSIS OF RIGHT LOWER EXTREMITY: ICD-10-CM

## 2025-02-10 PROCEDURE — 99214 OFFICE O/P EST MOD 30 MIN: CPT | Performed by: FAMILY MEDICINE

## 2025-02-10 PROCEDURE — 97112 NEUROMUSCULAR REEDUCATION: CPT | Mod: GP

## 2025-02-10 PROCEDURE — 3074F SYST BP LT 130 MM HG: CPT | Performed by: FAMILY MEDICINE

## 2025-02-10 PROCEDURE — 1159F MED LIST DOCD IN RCRD: CPT | Performed by: FAMILY MEDICINE

## 2025-02-10 PROCEDURE — 73610 X-RAY EXAM OF ANKLE: CPT | Mod: RIGHT SIDE | Performed by: RADIOLOGY

## 2025-02-10 PROCEDURE — 3078F DIAST BP <80 MM HG: CPT | Performed by: FAMILY MEDICINE

## 2025-02-10 PROCEDURE — 3008F BODY MASS INDEX DOCD: CPT | Performed by: FAMILY MEDICINE

## 2025-02-10 PROCEDURE — 73610 X-RAY EXAM OF ANKLE: CPT | Mod: RT

## 2025-02-10 PROCEDURE — 97110 THERAPEUTIC EXERCISES: CPT | Mod: GP

## 2025-02-10 PROCEDURE — 1123F ACP DISCUSS/DSCN MKR DOCD: CPT | Performed by: FAMILY MEDICINE

## 2025-02-10 ASSESSMENT — PAIN - FUNCTIONAL ASSESSMENT: PAIN_FUNCTIONAL_ASSESSMENT: 0-10

## 2025-02-10 ASSESSMENT — PAIN SCALES - GENERAL
PAINLEVEL_OUTOF10: 0 - NO PAIN
PAINLEVEL_OUTOF10: 0 - NO PAIN
PAINLEVEL_OUTOF10: 0-NO PAIN

## 2025-02-10 ASSESSMENT — ENCOUNTER SYMPTOMS
OCCASIONAL FEELINGS OF UNSTEADINESS: 0
LOSS OF SENSATION IN FEET: 0
DEPRESSION: 0

## 2025-02-10 NOTE — PROGRESS NOTES
"    Physical Therapy Treatment    Patient Name: Kiel Cantrell  MRN: [id number]  Encounter date:  2/10/2025  Time Calculation  Start Time: 1450  Stop Time: 1528  Time Calculation (min): 38 min     PT Therapeutic Procedures Time Entry  Neuromuscular Re-Education Time Entry: 13  Therapeutic Exercise Time Entry: 25       Visit Number:  8 (including evaluation)  Planned total visits:  12  Visits Authorized/Insurance Coverage:  NO AUTH, 96% [employer], [place], 1500 OOP, [employer] MCR     Current Problem  Problem List Items Addressed This Visit    None  Visit Diagnoses         Codes    Right ankle injury, initial encounter     S99.911A    High ankle sprain of right lower extremity, initial encounter     S93.491A              Precautions  Precautions  Precautions Comment: Per Bc Chu, pt now can be WBAT in boot as able, progressing to show as able    Pain  Pain Assessment: 0-10  0-10 (Numeric) Pain Score: 0 - No pain    Subjective  Pt reports no issues after last visit. Pain, function continue to improve. Pt does not intermittent anterior ankle pain, cannot point to a cause.     Objective  Decreased R stance time, heel strike, toe off with ambulation without crutches     Treatment:  Boot removed  Therapeutic Exercise:  In parallel bars:  - MoFlex calf stretch 3x30  - AMB FWD with fingertip support.  6 laps.  - AMB FWD with fingertip support.  6 laps.  - Sidestepping with fingertip support 3 laps.  - Staggered stance SL heel raise x10 ea  - Sit to stand chair + 2 airex  - Seated heel raise 2x10 20#   - Rocker board AP, s/s x15 ea     Seated  - Wobble board AP, s/s, cw, ccw x10 ea     Neuro Re-Ed:  Airex step up 2x10   NBoS on airex 3x30\"   + head turns, head nods x10 ea   + ball chest press, OH press x10 ea     Current HEP:  Access Code: [email or web address]  URL: [email or web address]  Date: 01/20/2025  Prepared by:   Exercises  - Supine Knee Extension Strengthening  - 2 x daily - 7 x weekly - 2 sets - 10 reps - 1 hold  - Supine Ankle " Circles  - 2 x daily - 7 x weekly - 2 sets - 10 reps - 1 hold  - Seated Heel Raise  - 2 x daily - 7 x weekly - 2 sets - 10 reps - 1 hold  - Seated Toe Raise  - 2 x daily - 7 x weekly - 2 sets - 10 reps - 1 hold     Issued 1/23/2025  SLR  Sideling hip ABD  Prone hip EXT    Has patient been consistent with performance of HEP? Yes    Assessment:  Pt's response to treatment: Exercise progressed with addition of gastroc strengthening and balance exercises. Pt completed all with fatigue but no pain. Some gait deviations remain when unsupported but pt more stable than previous weeks.   Areas of improvements: pain levels   Limitations/deficits: gait pattern      Pain end of session: 0/10    Plan:     Continue with current POC/no changes    Assessment of current progress against goals:  Progressing toward functional goals    Goals:  Active       PT Problem       STG, 6 visits:       Start:  01/06/25    Expected End:  02/20/25       Pt will be independent in HEP to improve R ankle and foot strength, ROM, and function.  Pt will perform household distance walking without assistive device, without significant deviation, and without pain.         Pt will improve R ankle pain-free ROM to full in all planes for improved body mechanics during functional mobility.        Start:  01/06/25    Expected End:  04/06/25            Pt will increase strength in R ankle by 1/2 MMT in all planes for increased tolerance to functional activities.        Start:  01/06/25    Expected End:  04/06/25            Pt will maintain SLS on R LE for 10s with EO for improved ankle proprioception and stability.        Start:  01/06/25    Expected End:  04/06/25            Pt will ambulate long community distances across all surfaces without pain or limitation.        Start:  01/06/25    Expected End:  04/06/25            Pt will tolerate >1 hour of standing activity without onset of pain for improved tolerance to work as a .         Start:  01/06/25     Expected End:  04/06/25            Pt will ascend/descend 2 flights of stairs reciprocally without pain for improved performance of household and community mobility.        Start:  01/06/25    Expected End:  04/06/25

## 2025-02-10 NOTE — PATIENT INSTRUCTIONS
May use PRICE therapy as needed.  Continue physical Therapy 1-2 times a week for 8-10 weeks with manual therapy as well as dry needling and IASTM  Again stressed the importance of wearing shoes with good stability control to help with the biomechanics affecting the lower legs  Again stressed the importance of wearing full foot insoles to help with the biomechanics affecting the lower legs  Recommendation over-the-counter vitamin-D3 9004-6756+ units a day with food as well as a daily multivitamin  Recommendation over-the-counter Move Free for joint health  May take OTC Tylenol Extra Strength or OTC Tylenol Arthritis, taking one every 6-8 hours with food as needed for pain management.  Patient advised regarding the risk and/or potential adverse reactions and/or side effects of any prescribed medications along with any over-the-counter medications or any supplements used. Patient advised to seek immediate medical care if any adverse reactions occur. The patient and/or patient(s) parent(s) verbalized their understanding.  Patient given even up to balance out to take away knee and hip issues  Reviewed RIGHT ANKLE MRI in detail with the patient and/or patients parent/legal guardian to their level of understanding; a copy of these results were provided to the patient and/or patients parent/legal guardian at the time of this office visit.   Continue to use crutches nonweight bearing   Referral to Dr. Richmond for varicose veins and venous insufficiency  Possibility regenerative injections in the future  Follow up in 5 weeks for mery-ray reevaluation

## 2025-02-11 PROBLEM — S82.446A: Status: RESOLVED | Noted: 2024-12-09 | Resolved: 2025-02-11

## 2025-02-13 ENCOUNTER — APPOINTMENT (OUTPATIENT)
Dept: PHYSICAL THERAPY | Facility: CLINIC | Age: 70
End: 2025-02-13
Payer: MEDICARE

## 2025-02-17 ENCOUNTER — APPOINTMENT (OUTPATIENT)
Dept: PHYSICAL THERAPY | Facility: CLINIC | Age: 70
End: 2025-02-17
Payer: MEDICARE

## 2025-02-20 ENCOUNTER — APPOINTMENT (OUTPATIENT)
Dept: PHYSICAL THERAPY | Facility: CLINIC | Age: 70
End: 2025-02-20
Payer: MEDICARE

## 2025-03-13 NOTE — PROGRESS NOTES
"Verbal consent of the patient and/or verbal parental consent for patients under the age of 18 have been obtained to conduct a physical examination at this office visit.    Established patient  History Of Present Illness  03/17/25 Kiel Cantrell is a 69 y.o. male who presents for a follow up of their Right ankle fracture s/p 16 weeks(112 days) tall XP walking boot and crutches. Patient reports decreased R ankle pain, noting his \"strength is getting better\", and he is getting to the point of \"putting less weight on the crutches\" for support. Patient states he has discontinued wearing the walking boot provided because it causes increased R knee pain because the boot decreases AROM flexion and extension. Patient notes he has been walking for short distances around his house, stating he is cautious about re-injuring his RIGHT ankle, and also voices frustration at the length of time it is taking to heal and the limitations he still has because of the injury. Patient notes he had been in physical therapy and has been compliant with his HEP. Patient reports 1/10 pain at the time of the appointment, noting it can increase to 4-5/10. Patient notes he sits and rests, and takes the boot off if that is the cause of the pain, when the RIGHT knee and ankle pain increases. Patient elaborates that he can develop a burning ache in the RIGHT ankle if it is held in one position for prolonged periods of time, noting it resolves with ankle circles and moving the RIGHT ankle.  I told him not I want him to get back into physical therapy even though he is doing all his exercises and stuff at home.  Also I told him based off of what I see on the x-ray it looks like now are dealing with a nonunion fracture there were going to get a hold of the bracing representative Bear May to see if we can get him a bone stimulator.  We are going to transition him out of the walking boot into a stable ankle brace.  They are going to continue to work " "with Dr. Richmond who is going to do some laser work on his vasculature sooner than later which will help his healing process as well.    All previous Progress Notes and imaging results related to this patients chief complaint have been reviewed in preparation for this examination.    Past Medical History  He has a past medical history of Acute deep vein thrombosis (DVT) of calf muscle vein of left lower extremity (Multi) (11/08/2023), HL (hearing loss), Unspecified atrial fibrillation (Multi) (03/27/2014), and Visual impairment.    Surgical History  He has a past surgical history that includes Vasectomy and Eye surgery.     Social History  He reports that he has never smoked. He has never been exposed to tobacco smoke. He has never used smokeless tobacco. He reports current alcohol use of about 3.0 standard drinks of alcohol per week. He reports that he does not use drugs.    Family History  Family History   Problem Relation Name Age of Onset    Early natural death Mother Nara Cantrell     Miscarriages / Stillbirths Mother Nara Cantrell     Lung disease Father Nolan Cantrell     COPD Father Nolan Cantrell     No Known Problems Sister      No Known Problems Daughter      No Known Problems Son      Cancer Paternal Grandmother Supriya Cantrell      History Of Present Illness  12/9/24 Kiel Cantrell is a 69 y.o. male presenting with RIGHT ankle pain. Patient reports he fell on ice in the driveway last Tuesday and the R ankle was \"locked in place\" and could not move, and was forced in to inversion. Patient stated his R knee hurt more than his ankle at first, but he first noticed the ankle pain when he stood on it when he got up. Patient relays he attempted to move his R ankle to relieve the pain as he had with previous ankle injuries noting it did not decrease.  Patient went to an urgent care location 12/8/24, had X-rays, and was provided a walking boot. Patient has been using crutches he had at home for a past knee surgery. Patient " "came into the appointment without the walking boot on because it is too painful due to swelling. Patient reports 3/10 at rest, noting it was 8/10 at the time of the injury. Patient states the pain has been gradually improving, but still present with weight bearing.  We reviewed patient x-ray results in detail.  Patient x-rays show a spiral fracture of the distal fibula as well as significant swelling.  Upon exam patient has indications of a high ankle sprain particularly over the syndesmotic ligaments as well as medial and lateral ankle sprain and marked tenderness over the talus with concern for occult fracture.  As such after discussion due to patient's physical exam and due to the nature of the injury we will order an MRI to evaluate for underlying soft tissue injury and concern for need for referral to orthopedic surgery for syndesmotic repair.  We will have patient continue with his walking boot and will be nonweightbearing in a pair of crutches.  Patient is to not drive.  We can follow-up after MRI and adjust treatment as indicated at that time.  Patient and spouse verbalized understanding and agreement with plan of care.  ----------------------------------------------------------  12/23/24 Kiel Cantrell is a 69 y.o. male presenting for his MRI follow up of his RIGHT ankle. Since last visit in office, patient states that he feels improved. Presents using crutches and wearing tall walking boot as previously instructed. Rates current pain as a 1/10 that is \"every so often\".  We reviewed patient MRI results in detail.  Patient and spouse verbalized understanding of results.  We discussed treatment options and will have patient continue with walking boot and crutches as previously prescribed as well as physical therapy.  Patient will also continue with calcium and vitamin D as previously recommended.  We can reevaluate in 4 weeks for mery-ray and adjust treatment as indicated at that time.  Patient does not warrant " "surgery at this time but we will reevaluate at a future appointment and if patient continues to have significant pain after completing therapy and complete resolution of his fracture we can consider referral to surgery for reevaluation.  Patient and spouse verbalized understanding and agreement with plan of care.   ----------------------------------------------------------  01/14/25 Kiel Cantrell is a 69 y.o. male who presents for a follow up of their Right ankle fracture s/p 6 weeks tall XP walking boot and crutches.  States that he is having 1/10 pain today with prolonged walking.  Still has a significant amount of swelling in his lower legs both legs obviously the 1 that fractured has some more swelling.  X-ray shows that sclerosis is noted however he does have significant varicose veins and venous insufficiency so we will get him into see Dr. Richmond.  Also stressed to him the importance of continuing to take his calcium as well as his vitamin D and the supplement  Symptum to help with bony healing.  I told him he should should still  use crutches while in the walking boot and additionally gave him an even up to take pressure off of his knees.  Additionally we discussed his MRI once again and all of the ligaments and tendons that he injured.. He was seen in by Mikel Estrada in [place] No new or worsening symptoms.  We also talked about regenerative injections in the future if needed.   ----------------------------------------------------------  02/10/25 Kiel Cantrell is a 69 y.o. male who presents for a follow up of their Right ankle fracture s/p 10 weeks tall XP walking boot and crutches. Patient reports he feels his RIGHT ankle is improving, noting his RIGHT lower extremity is \"getting stronger\" and he is able to put more weight on it during PT. Patient states he has been compliant with the HEP provided at therapy, noting he uses a kitchen counter for support as needed. Patient states he has been decreasing crutch " "use, but is still dependant on them to decrease the weight and pressure on the RIGHT lower extremity. Patient came into the appointment without the walking boot on, stating he had PT this morning and had a Re-xray of the RIGHT ankle prior to the appointment and wanted to avoid the inconvenience of taking the boot of multiple times. Patient relays he has been walking short distances around his house like going to the bathroom without the boot alternating between crutch or counter support, stating he wears the boot and uses crutches for partial weight bearing as tolerated in public. Patient denies RIGHT ankle pain at the time of the appointment, reporting brief \"stinging\" intermittently throughout the day and relaying the weight of a shoe can cause pain if he is sitting with his RIGHT leg elevated with his RIGHT foot hanging off the edge of the foot rest. Patient relays the pain is resolved with ankle movement. Patient denies using NSAIDs or Tylenol or other interventions to reduce the pain and symptoms. Patient states he has been taking the calcium and vitamin D supplements as recommended and a supplement recommended by his varicose vein specialist Dr. Richmond at the last appointment.  I told him based off of his x-ray results if he is going to be going and walking long periods of ways are outside he should still be wearing the walking boot and crutches.  However if he has to get up to go to the bathroom off of the couch it is okay for him to go without the walking boot.      Allergies  Cat dander    Review of Systems  CONSTITUTIONAL:   Negative for weight change, loss of appetite, fatigue, weakness, fever, chills, night sweats, headaches .           HEENT:   Negative for cold, cough, sore throat, sinus pain, swollen lymph nodes.           OPHTHALMOLOGY:   Negative for diminished vision, blurred vision, loss of vision, double vision.           ALLERGY:   Negative for runny nose, scratchy throat, sinus congestion, " rash, facial pressure, nasal congestion, post-nasal drip.           CARDIOLOGY:   Negative for chest pain, palpitations, murmurs, irregular heart beat, shortness of breath, leg edema, dyspnea on exertion, fatigue, dizziness.           RESPIRATORY:   Negative for chest pain, shortness of breath, swelling of the legs, asthma/copd, chest congestion, pain with breathing .           GASTROENTEROLOGY:   Negative for nausea, vomitting, heartburn, constipation, diarrhea, blood in stool, change in bowel habits, black stool.           HEMATOLOGY/LYMPH:   Negative for fatigue, loss of appetitie, easy bruising, easy bleeding, anemia, abnormal bleeding, slow healing.           ENDOCRINOLOGY:   Negative for polyuria, polydipsia, polyphagia, fatigue, weight loss, weight gain, cold intolerance, heat intolerance, diabetes.           MUSCULOSKELETAL:   Positive  for Right ankle        DERMATOLOGY:   Negative for rash, bruising.           NEUROLOGY:   Negative for tingling, numbness, gait abnormality, paresthesias, weakness, sciatica.        Examination:   All findings improved since previous visit  Right Ankle and Foot  Edema: Negative  Ecchymosis/Bruising: Negative   percussion Test: Negative  Tuning Fork Test: Negative     Orientation: All findings improved since previous visit  Positive  Asymmetrical, because of RIGHT ankle starting to atrophy         ROM: All findings improved since previous visit  Positive, no longer pain with plantarflexion and dorsiflexion combined with inversion and eversion and now full range of motion just complains of a little bit of stiffness           Muscle Strength: All findings improved since previous visit  +5/+5 Planar Flexion,  +5/+5  Dorsi Flexion    +5/+5 Inversion   +5/+5 Eversion      +5/+5 Plantarflexion in combination with inversion  +5/+5 Plantarflexion in combination with eversion         +5/+5 Dorsiflexion in combination with inversion (Posterior Tibialis)  +5/+5 Dorsiflexion in  combination with eversion (Peroneal Brevis)   +5/+5 Dorsiflexion in combination with eversion and flexion of great toe (Peroneal Longus).      Palpation: All findings improved since previous visit  Positive, still very slight tender to Palpation Right lateral greater than medial malleolus, as well as distal fibula and over the ATF and CF ligament and anterior inferior tib-fib ligament           Vascular:   +2/+4 Dorsalis Pedis  +2/+4 Posterior Tibialis  Capillary Refill < 2 Seconds.        Leg/Ankle/Foot - Ankle Impingement:  Posterior Ankle Impingement Test: Negative.   Anterior Ankle Impingement Test: Negative.          Leg/Ankle/Foot - Ankle Instability:All findings improved since previous visit  Flexibility Test: Negative.  Anterior Drawer Test:   Positive.  Continued improvement   talar Tilt Test:  Negative.   External Rotation Kleiger Plantar Flexion Test: Negative  External Rotation Kleiger Dorsiflexion Test: Negative   squeeze Test: Negative   dorsiflexion Test:  Negative.   Posterior Tibial Instability Test: Negative.  Peroneal Tendon Instability Test:  Negative.  Horizontal Squeeze Test: Negative   vertical Squeeze Test: Negative.   Standing/Walking Test: Negative        Leg/Ankle/Foot - DVT:  Maikol's Sign: Negative.          Leg/Ankle/Foot - Forefoot:  Strunsky Test:  Negative.   Gaenslen Maneuver Test: Negative.   Metatarsal Tap Test: Negative  Crepitation Test: Negative.          Leg/Ankle/Foot - Hindfoot Achilles/Calcaneus:  Salazar Compression Test: Negative.   Hoffa Sign: Negative.   Achilles Tendon Tap Test: Negative.   Heel Compression Test: Negative.          Leg/Ankle/Foot - Nerve Irritation:  Sahara Sign: Negative.   Digital Nerve Stretch Test: Negative.   Tinel Sign: Negative.   Tourniquet Sign: Negative.          Feet/Foot:   Positive BILATERAL  Valgus foot      Imaging and Diagnostics Review:  My personal overview of the new x-rays of the right ankle compared to the older x-rays show once  again over the past 3 x-rays no signs of any significant bony sclerosis and change.  What looks to be no significant sclerosis or bony healing noted and still some effusion noted consistent with nonunion/malunion    ---------------------------------------------  XR ANKLE RIGHT 3+ VIEWS; ;  2/10/2025 3:50 pm      INDICATION:  Signs/Symptoms:RIGHT ANKLE PAIN.      ,S93.409A Sprain of unspecified ligament of unspecified ankle,  initial encounter,S93.499A Sprain of other ligament of unspecified  ankle, initial encounter,S82.455D Nondisplaced comminuted fracture of  shaft of left fibula, subsequent encounter for closed fracture with  routine healing,S93.491D Sprain of other ligament of right ankle,  subsequent encounter,M76.71 Peroneal tendinitis, right leg,M76.71  Peroneal tendinitis, right leg,S99.911D Unspecified injury of right  ankle, subsequent encounter,S93.401D Sprain of unspecified ligament  of right ankle, subsequent encounter,I83.90 Asymptomatic varicose  veins of unspecified lower extremity,M67.88 Other specified disorders  of synovium and tendon, other site,S99.911A Unspecified injury of  right ankle, initial encounter      COMPARISON:  01/13/2025.      ACCESSION NUMBER(S):  [id number]      ORDERING CLINICIAN:  MARYANNE FRITZ      FINDINGS:  Right ankle, three views      There is a subacute healing distal fibular fracture deformity with  increased callus formation. No new fracture seen. There is  bimalleolar soft tissue edema. The ankle mortise is maintained      IMPRESSION:  Subacute healing distal fibular fracture deformity without change in  alignment    Signed by: Sai Pham 2/11/2025 6:15 PM  -------------------------------------------------------------------------------  Interpreted By:  Elvira Pineda and Stephens Katherine   STUDY: MRI of the right ankle without IV contrast;  12/19/2024 9:05 am      INDICATION: Signs/Symptoms:RIGHT ANKLE PAIN.      COMPARISON: None.      ACCESSION NUMBER(S):  [id number]      ORDERING CLINICIAN: APRIL DRIVER      TECHNIQUE: MR imaging of the  right ankle was obtained  without administration  of intravenous contrast medium.      FINDINGS:  TENDONS:  The extensor tendons are intact and demonstrate a normal course. Mild tendinosis and tenosynovitis of the posterior tibial and flexor  hallucis longus tendons. Moderate tendinosis of the peroneus longus tendon. Split tear of the peroneus brevis tendon with distal  reconstitution. The Achilles tendon is intact with mild tendinosis.  The plantar aponeurosis is intact.      LIGAMENTS:  High-grade partial-thickness tear of the anterior inferior tibiofibular ligament with increased attenuation of the central  fibers. Nonvisualization of the anterior talofibular ligament consistent with tear. High-grade sprain of the calcaneofibular  ligament. The posterior talofibular ligament is intact. The posterior tibiofibular ligament is intact. The deep and superficial components  of the deltoid ligament are intact. The spring ligament is intact.      JOINTS:  There is no dislocation. There is no joint effusion. The articular cartilage of the ankle joint is normal. There is no osteochondral  defect in the talar dome.      OSSEOUS STRUCTURES:  Re-demonstration of a subacute nondisplaced distal fibular fracture.  There is no marrow replacing lesion. A benign cyst is noted in the lateral cuneiform.      SOFT TISSUES:  There is no muscle atrophy or tear.  The tarsal tunnel is normal.  The sinus tarsi is normal with preservation of fat signal.      IMPRESSION MRI of the right ankle December 19, 2024:  1. Nonvisualization of the anterior talofibular ligament consistent with complete tear.  2. High-grade partial-thickness tear of the anterior inferior tibiofibular ligament.  3. Re-demonstration of subacute nondisplaced distal fibular fracture.  4. High-grade sprain of the calcaneofibular ligament.  5. Split tear of the peroneus brevis tendon  with distal reconstitution.  6. Moderate peroneus longus, mild posterior tibial and flexor hallucis longus tendinosis.  7. Achilles tendon is intact with mild tendinosis.        I personally reviewed the images/study and I agree with Noemi Freeman DO's (radiology resident) findings as stated. This study  was interpreted at [place], [place].      MACRO: None      Signed by: Elvira Pineda 12/19/2024 5:31 PM  Dictation workstation:   [id number]    Assessment   1. Complete tear of ligament of ankle  XR ankle right 3+ views      2. Partial tear of ligament of lateral aspect of ankle  XR ankle right 3+ views      3. High ankle sprain, right, subsequent encounter  XR ankle right 3+ views      4. Peroneus brevis tendinitis, right  XR ankle right 3+ views      5. Peroneus longus tendinitis, right  XR ankle right 3+ views      6. Right ankle injury, subsequent encounter  XR ankle right 3+ views      7. Sprain of right ankle, subsequent encounter  XR ankle right 3+ views      8. Asymptomatic varicose veins  XR ankle right 3+ views      9. Achilles tendinosis of right lower extremity  XR ankle right 3+ views      10. Nondisplaced comminuted fracture of shaft of left fibula, subsequent encounter for closed fracture with nonunion            Treatment or Intervention:  May continue PRICE therapy as needed.  Start back into physical Therapy 1-2 times a week for 8-10 weeks with manual therapy as well as dry needling and IASTM  Again stressed the importance of wearing shoes with good stability control to help with the biomechanics affecting the lower legs  Again stressed the importance of wearing full foot insoles to help with the biomechanics affecting the lower legs  Continue over-the-counter vitamin-D3 8159-1649+ units a day with food as well as Sympthum and a daily multivitamin  Continue over-the-counter Move Free for joint health  May continue to take OTC Tylenol Extra  Strength or OTC Tylenol Arthritis, taking one every 6-8 hours with food as needed for pain management.  Patient advised regarding the risk and/or potential adverse reactions and/or side effects of any prescribed medications along with any over-the-counter medications or any supplements used. Patient advised to seek immediate medical care if any adverse reactions occur. The patient and/or patient(s) parent(s) verbalized their understanding.  Continue even up to balance out to take away knee and hip issues  Continue to use crutches and tall XP walking boot nonweight bearing   Will transition out of tall XP walking boot into a stable ankle brace  Continue follow-ups with Dr. Richmond for varicose veins and venous insufficiency  Possibility regenerative injections in the future for his ankle ligaments  Bone stimulator representative Bear May notified for bone stimulator for nonunion/malunion fracture distal fibula  Follow up in 4 weeks for mery-ray reevaluation    Please note that this report has been produced using speech recognition software.  It may contain errors related to grammar, punctuation or spelling.  Electronically signed, but not reviewed.  JED Jett, Director of Sports Medicine    MARYANNE FRITZ on 3/17/25 at 4:53 PM.     ESVIN Jett DO

## 2025-03-17 ENCOUNTER — HOSPITAL ENCOUNTER (OUTPATIENT)
Dept: RADIOLOGY | Facility: CLINIC | Age: 70
Discharge: HOME | End: 2025-03-17
Payer: MEDICARE

## 2025-03-17 ENCOUNTER — OFFICE VISIT (OUTPATIENT)
Dept: SPORTS MEDICINE | Facility: CLINIC | Age: 70
End: 2025-03-17
Payer: MEDICARE

## 2025-03-17 VITALS
SYSTOLIC BLOOD PRESSURE: 112 MMHG | HEART RATE: 66 BPM | BODY MASS INDEX: 35.87 KG/M2 | WEIGHT: 310 LBS | HEIGHT: 78 IN | DIASTOLIC BLOOD PRESSURE: 62 MMHG

## 2025-03-17 DIAGNOSIS — S93.409A COMPLETE TEAR OF LIGAMENT OF ANKLE: ICD-10-CM

## 2025-03-17 DIAGNOSIS — M76.71 PERONEUS LONGUS TENDINITIS, RIGHT: ICD-10-CM

## 2025-03-17 DIAGNOSIS — S99.911D RIGHT ANKLE INJURY, SUBSEQUENT ENCOUNTER: ICD-10-CM

## 2025-03-17 DIAGNOSIS — S82.455D NONDISPLACED COMMINUTED FRACTURE OF SHAFT OF LEFT FIBULA, SUBSEQUENT ENCOUNTER FOR CLOSED FRACTURE WITH ROUTINE HEALING: ICD-10-CM

## 2025-03-17 DIAGNOSIS — S93.491D HIGH ANKLE SPRAIN, RIGHT, SUBSEQUENT ENCOUNTER: ICD-10-CM

## 2025-03-17 DIAGNOSIS — M67.88 ACHILLES TENDINOSIS OF RIGHT LOWER EXTREMITY: ICD-10-CM

## 2025-03-17 DIAGNOSIS — S93.499A PARTIAL TEAR OF LIGAMENT OF LATERAL ASPECT OF ANKLE: ICD-10-CM

## 2025-03-17 DIAGNOSIS — I83.90 ASYMPTOMATIC VARICOSE VEINS: ICD-10-CM

## 2025-03-17 DIAGNOSIS — S82.455K: ICD-10-CM

## 2025-03-17 DIAGNOSIS — S93.401D SPRAIN OF RIGHT ANKLE, SUBSEQUENT ENCOUNTER: ICD-10-CM

## 2025-03-17 DIAGNOSIS — M76.71 PERONEUS BREVIS TENDINITIS, RIGHT: ICD-10-CM

## 2025-03-17 PROCEDURE — 3008F BODY MASS INDEX DOCD: CPT | Performed by: FAMILY MEDICINE

## 2025-03-17 PROCEDURE — 3078F DIAST BP <80 MM HG: CPT | Performed by: FAMILY MEDICINE

## 2025-03-17 PROCEDURE — 3074F SYST BP LT 130 MM HG: CPT | Performed by: FAMILY MEDICINE

## 2025-03-17 PROCEDURE — 73610 X-RAY EXAM OF ANKLE: CPT | Mod: RT

## 2025-03-17 PROCEDURE — 1036F TOBACCO NON-USER: CPT | Performed by: FAMILY MEDICINE

## 2025-03-17 PROCEDURE — 1123F ACP DISCUSS/DSCN MKR DOCD: CPT | Performed by: FAMILY MEDICINE

## 2025-03-17 PROCEDURE — 1159F MED LIST DOCD IN RCRD: CPT | Performed by: FAMILY MEDICINE

## 2025-03-17 PROCEDURE — 99214 OFFICE O/P EST MOD 30 MIN: CPT | Performed by: FAMILY MEDICINE

## 2025-03-17 PROCEDURE — L1971 AFO W/ANKLE JOINT, PREFAB: HCPCS | Performed by: FAMILY MEDICINE

## 2025-03-17 ASSESSMENT — COLUMBIA-SUICIDE SEVERITY RATING SCALE - C-SSRS
6. HAVE YOU EVER DONE ANYTHING, STARTED TO DO ANYTHING, OR PREPARED TO DO ANYTHING TO END YOUR LIFE?: NO
2. HAVE YOU ACTUALLY HAD ANY THOUGHTS OF KILLING YOURSELF?: NO
1. IN THE PAST MONTH, HAVE YOU WISHED YOU WERE DEAD OR WISHED YOU COULD GO TO SLEEP AND NOT WAKE UP?: NO

## 2025-03-17 ASSESSMENT — PATIENT HEALTH QUESTIONNAIRE - PHQ9
1. LITTLE INTEREST OR PLEASURE IN DOING THINGS: NOT AT ALL
SUM OF ALL RESPONSES TO PHQ9 QUESTIONS 1 AND 2: 0
2. FEELING DOWN, DEPRESSED OR HOPELESS: NOT AT ALL

## 2025-03-17 ASSESSMENT — ENCOUNTER SYMPTOMS
OCCASIONAL FEELINGS OF UNSTEADINESS: 0
LOSS OF SENSATION IN FEET: 0
DEPRESSION: 0

## 2025-03-17 ASSESSMENT — PAIN SCALES - GENERAL
PAINLEVEL_OUTOF10: 0-NO PAIN
PAINLEVEL_OUTOF10: 0 - NO PAIN

## 2025-03-17 ASSESSMENT — PAIN DESCRIPTION - DESCRIPTORS: DESCRIPTORS: ACHING

## 2025-03-17 ASSESSMENT — PAIN - FUNCTIONAL ASSESSMENT: PAIN_FUNCTIONAL_ASSESSMENT: 0-10

## 2025-03-18 ENCOUNTER — TELEPHONE (OUTPATIENT)
Age: 70
End: 2025-03-18
Payer: MEDICARE

## 2025-03-18 DIAGNOSIS — I48.19 PERSISTENT ATRIAL FIBRILLATION (MULTI): Primary | ICD-10-CM

## 2025-03-18 NOTE — TELEPHONE ENCOUNTER
Rx refill- 90 days     Pharmacy-   Pharmacy - [facility] - [facility] Sites Phone: [phone]   Fax: [phone]           Medication-   Dispensed Days Supply Quantity Provider Pharmacy   ELIQUIS 5MG      TAB 12/16/2024 90 180 each MD PHIL Sung

## 2025-03-20 RX ORDER — APIXABAN 5 MG/1
5 TABLET, FILM COATED ORAL 2 TIMES DAILY
Qty: 180 TABLET | Refills: 3 | Status: SHIPPED | OUTPATIENT
Start: 2025-03-20

## 2025-03-24 ENCOUNTER — DOCUMENTATION (OUTPATIENT)
Dept: PHYSICAL THERAPY | Facility: CLINIC | Age: 70
End: 2025-03-24
Payer: MEDICARE

## 2025-03-24 NOTE — PROGRESS NOTES
Discharge Summary    Name: Kiel Cantrell  MRN: [id number]  : 1955  Date: 25    Discharge Summary: PT    Discharge Information: Date of discharge 25 and Date of last visit 2/10/25    Therapy Summary: Pt not to therapy since above date. Pt canceled/did not attend any remaining sessions.    Rehab Discharge Reason: Failed to schedule and/or keep follow-up appointment(s)

## 2025-03-31 PROBLEM — S82.454M: Status: ACTIVE | Noted: 2025-03-31

## 2025-03-31 PROBLEM — S82.454K: Status: ACTIVE | Noted: 2024-12-23

## 2025-03-31 PROBLEM — S82.454M: Status: RESOLVED | Noted: 2025-03-31 | Resolved: 2025-03-31

## 2025-04-02 DIAGNOSIS — I10 ESSENTIAL HYPERTENSION: Primary | ICD-10-CM

## 2025-04-04 RX ORDER — ROSUVASTATIN CALCIUM 20 MG/1
20 TABLET, COATED ORAL DAILY
Qty: 90 TABLET | Refills: 3 | Status: SHIPPED | OUTPATIENT
Start: 2025-04-04

## 2025-04-24 ENCOUNTER — TREATMENT (OUTPATIENT)
Dept: PHYSICAL THERAPY | Facility: CLINIC | Age: 70
End: 2025-04-24
Payer: MEDICARE

## 2025-04-24 ENCOUNTER — CLINICAL SUPPORT (OUTPATIENT)
Dept: PHYSICAL THERAPY | Facility: CLINIC | Age: 70
End: 2025-04-24
Payer: MEDICARE

## 2025-04-24 DIAGNOSIS — S93.491D HIGH ANKLE SPRAIN, RIGHT, SUBSEQUENT ENCOUNTER: ICD-10-CM

## 2025-04-24 DIAGNOSIS — M76.71 PERONEUS BREVIS TENDINITIS, RIGHT: ICD-10-CM

## 2025-04-24 DIAGNOSIS — M76.71 PERONEUS LONGUS TENDINITIS, RIGHT: ICD-10-CM

## 2025-04-24 DIAGNOSIS — S93.401D SPRAIN OF RIGHT ANKLE, SUBSEQUENT ENCOUNTER: ICD-10-CM

## 2025-04-24 DIAGNOSIS — S93.491A HIGH ANKLE SPRAIN OF RIGHT LOWER EXTREMITY, INITIAL ENCOUNTER: ICD-10-CM

## 2025-04-24 DIAGNOSIS — I83.90 ASYMPTOMATIC VARICOSE VEINS: ICD-10-CM

## 2025-04-24 DIAGNOSIS — S99.911D RIGHT ANKLE INJURY, SUBSEQUENT ENCOUNTER: ICD-10-CM

## 2025-04-24 DIAGNOSIS — S93.409A COMPLETE TEAR OF LIGAMENT OF ANKLE: ICD-10-CM

## 2025-04-24 DIAGNOSIS — S99.911A RIGHT ANKLE INJURY, INITIAL ENCOUNTER: ICD-10-CM

## 2025-04-24 DIAGNOSIS — S93.499A PARTIAL TEAR OF LIGAMENT OF LATERAL ASPECT OF ANKLE: ICD-10-CM

## 2025-04-24 DIAGNOSIS — S82.455D NONDISPLACED COMMINUTED FRACTURE OF SHAFT OF LEFT FIBULA, SUBSEQUENT ENCOUNTER FOR CLOSED FRACTURE WITH ROUTINE HEALING: ICD-10-CM

## 2025-04-24 PROCEDURE — 97161 PT EVAL LOW COMPLEX 20 MIN: CPT | Mod: GP | Performed by: PHYSICAL THERAPIST

## 2025-04-24 ASSESSMENT — ENCOUNTER SYMPTOMS
LOSS OF SENSATION IN FEET: 1
OCCASIONAL FEELINGS OF UNSTEADINESS: 0
DEPRESSION: 0

## 2025-04-24 NOTE — PROGRESS NOTES
Physical Therapy    Physical Therapy Evaluation and Treatment    Patient Name: Kiel Cantrell  MRN: [id number]  Encounter Date: 4/24/2025     Time Calculation  Start Time: 1100  Stop Time: 1137  Time Calculation (min): 37 min    Current Problem:   1. Complete tear of ligament of ankle  Referral to Physical Therapy    Follow Up In Physical Therapy    s/p 6 weeks tall XP walking boot and crutches      2. Partial tear of ligament of lateral aspect of ankle  Referral to Physical Therapy    Follow Up In Physical Therapy    s/p 6 weeks tall XP walking boot and crutches      3. Nondisplaced comminuted fracture of shaft of left fibula, subsequent encounter for closed fracture with routine healing  Referral to Physical Therapy    Follow Up In Physical Therapy    s/p 6 weeks tall XP walking boot and crutches      4. High ankle sprain, right, subsequent encounter  Referral to Physical Therapy    Follow Up In Physical Therapy    s/p 6 weeks tall XP walking boot and crutches      5. Peroneus brevis tendinitis, right  Referral to Physical Therapy    Follow Up In Physical Therapy      6. Peroneus longus tendinitis, right  Referral to Physical Therapy    Follow Up In Physical Therapy      7. Right ankle injury, subsequent encounter  Referral to Physical Therapy    Follow Up In Physical Therapy      8. Sprain of right ankle, subsequent encounter  Referral to Physical Therapy    Follow Up In Physical Therapy    s/p 6 weeks tall XP walking boot and crutches      9. Asymptomatic varicose veins  Referral to Physical Therapy    Follow Up In Physical Therapy          Relevant Past Medical History: Thyroid disorder, Afib, L TKA 2020   Medications: See chart  Allergies: Cat Dander    Precautions:   STEADI Fall Risk: 3 (score of 4+ indicates fall risk)  WBAT in ankle brace.     SUBJECTIVE:   The pt  is a 69 year old male presenting with right ankle pain.  He was orginally injured in early December of 2024 when he fell on ice.  [facility] three days later and x-rays revealed a fibular fracture.    Pt was provided with a walking boot and crutches.  He attended PT from 1/6/25 to 2/10/25.    PT was discontinued in February as bone healing was delayed.    More recently, the patient was provided with an ankle brace.  Has been using a bone stimulator for two weeks.  Additionally, he has reduced to using one crutch.  Patient voices right knee pain that limit his mobility.    Imaging:   3/17/25  IMPRESSION:  Subacute healing distal fibular fracture deformity without change in  alignment. Tiny avulsion injury at the dorsal aspect of the talus  again seen     Pain:   Current: 2/10  Lowest: 0/10  Highest: 3/10  Location: Right LE/Right Ankle  Patient notes right knee pain has been increasing.  Description: Ache/Throb  Aggravating Factors: Weightbearing activities for prolonged period.  Relieving Factors: Rest  Sleep Pattern: Some disturbed sleep  Previous Interventions: PT earlier this year.  Patient has an ankle brace that he tolerates well.     Red flags:   Red flags   Hx of CA No   Pacemaker/ Electronic Implant No   Saddle Anesthesia No   Bowel/Bladder Changes No   Sudden Weakness No   Recent Falls (within last 6mo) No       Occupation: Part time work painting  Home Living: Stairs at home; lives with spouse  Current Level of Function: Modified independent  Prior Level of Function: Indepednent    Patient/Family Goal: \"To do more on my feet\"    Outcome Measures:   Other Measures  Lower Extremity Funtional Score (LEFS): 41    OBJECTIVE:    Cognition: Alert and oriented  Posture: Rounded shoulders.  Forward head.   Gait: Pt ambulates with one crutch; able to ambulate without device.  Functional Mobility: Modified independent.  Palpation: No significant tenderness.  No redness  AROM:  Right Ankle DF 7 degrees  Right Ankle PF 40 degrees  Right Ankle Eversion 8 degrees  Right Ankle Inversion 15 degrees    MMT:  Right Ankle DF 4/5  Right Ankle PF 4+/5  Right " Ankle Eversion 4+/5  Right Ankle Inversion 4/5    Right Knee Ext 4/5    Balance:  SLS 1-2 seconds  SemiTandem Stance 5-8 seconds  Tandem Stance 5 seconds    Stair Climbing step to manner with handrails      Treatments:  Therapeutic Exercise: (6 minutes)  Calf Stretch  TKE into ball    Neuromuscular Re-education: (2 minutes)  Kineisiotaping to right knee for patellar alignment with two I strips.    The pt was instructed with skin and tape care.    OP Education: HEP, GOALS, POC.     HEP:  Calf Stretch  TKE into ball  Review of previous home program.    Response to treatment: No pain noted.    ASSESSMENT:   The pt is a 69 year old male presenting with ROM loss, weakness, balance impairment, pain, and functional mobility loss.  Pt would benefit from additional PT services to optimize abilities.    Complexity of Evaluation:   Based on the history including personal factors and/or comorbidities, examination of body systems including body structures and function, activity limitations, and/or participation restrictions, as well as clinical presentation, patient meets criteria for a low complexity evaluation.    Low complexity due to patient's clinical presentation being stable and uncomplicated by any significant comorbidities that may affect rehab tolerance and progression.       PLAN:  OP PT PLAN:  Treatment/Interventions: Aquatic Therapy , Cryotherapy , Dry Needling  , Education/Instruction , Electrical Stimulation , Gait training , Hot Pack , Manual Therapy  , Neuromuscular re-education , Self care/home management , Taping techniques , Therapeutic activities , Therapeutic exercise  , and Ultrasound   PT Plan: Skilled PT   PT Frequency: 1-2 times per week  Duration: 10 visits  Insurance: 4/24/25: NO AUTH, 96% [place], [place] O[place], [employer] [facility]  01/02/2025: NO AUTH, 96% [place], [employer] [facility]   Certification Period Start Date: 4/24/25  Certification Period End Date: 7/22/2025  Rehab Potential: Excellent  Plan of  Care Agreement: Patient       Goals:   SHORT TERM GOALS  1) Pt will decrease pain from 3/10 to 0/10 for improved activity tolerance  2) Pt will improve right ankle AROM to FULL to facilitate improved functional mobility without limitation    3) Pt will demonstrate x30 L SLR without quad lag to reflect improvement in quadriceps muscular endurance and increased ease with bed mobility/car transfers  4) Pt will demonstrate painfree, symmetrical sit to stand to/from chair without UE support to improve transfers  5) Pt will be independent with HEP.     LONG TERM GOALS  1) Pt will demonstrate independence with HEP for self management of condition  2) Pt will improve Right ankle DF, PF, INV, EVER and  hip flexion and knee flexion/extension strength to 5/5 for improved standing and walking tolerance  3) Pt will improve gait to independent without assistive device and normalized mechanics for improved ambulation  4) Pt will improve stair negotiation to reciprocal without UE support for improved home/community navigation     5) SLS to 10 seconds or more.

## 2025-04-28 ENCOUNTER — TREATMENT (OUTPATIENT)
Dept: PHYSICAL THERAPY | Facility: CLINIC | Age: 70
End: 2025-04-28
Payer: MEDICARE

## 2025-04-28 DIAGNOSIS — S99.911D RIGHT ANKLE INJURY, SUBSEQUENT ENCOUNTER: ICD-10-CM

## 2025-04-28 DIAGNOSIS — M76.71 PERONEUS BREVIS TENDINITIS, RIGHT: ICD-10-CM

## 2025-04-28 DIAGNOSIS — S82.455D NONDISPLACED COMMINUTED FRACTURE OF SHAFT OF LEFT FIBULA, SUBSEQUENT ENCOUNTER FOR CLOSED FRACTURE WITH ROUTINE HEALING: ICD-10-CM

## 2025-04-28 DIAGNOSIS — M76.71 PERONEUS LONGUS TENDINITIS, RIGHT: ICD-10-CM

## 2025-04-28 DIAGNOSIS — S93.491D HIGH ANKLE SPRAIN, RIGHT, SUBSEQUENT ENCOUNTER: ICD-10-CM

## 2025-04-28 DIAGNOSIS — S93.499A PARTIAL TEAR OF LIGAMENT OF LATERAL ASPECT OF ANKLE: ICD-10-CM

## 2025-04-28 DIAGNOSIS — I83.90 ASYMPTOMATIC VARICOSE VEINS: ICD-10-CM

## 2025-04-28 DIAGNOSIS — S93.409A COMPLETE TEAR OF LIGAMENT OF ANKLE: ICD-10-CM

## 2025-04-28 DIAGNOSIS — S93.401D SPRAIN OF RIGHT ANKLE, SUBSEQUENT ENCOUNTER: ICD-10-CM

## 2025-04-28 PROCEDURE — 97110 THERAPEUTIC EXERCISES: CPT | Mod: GP

## 2025-04-28 ASSESSMENT — PAIN SCALES - GENERAL: PAINLEVEL_OUTOF10: 0 - NO PAIN

## 2025-04-28 ASSESSMENT — PAIN - FUNCTIONAL ASSESSMENT: PAIN_FUNCTIONAL_ASSESSMENT: 0-10

## 2025-04-28 NOTE — PROGRESS NOTES
"    Physical Therapy Treatment    Patient Name: Kiel Cantrell  MRN: [id number]  Encounter date:  4/28/2025  Time Calculation  Start Time: 1530  Stop Time: 1612  Time Calculation (min): 42 min     PT Therapeutic Procedures Time Entry  Therapeutic Exercise Time Entry: 42       Visit Number:  2 (including evaluation)  Planned total visits:    Visits Authorized/Insurance Coverage:  NO AUTH, 96% [place] O[place], [employer] [facility]   01/02/2025: NO AUTH, 96% [place], [place] O[place], [employer] [facility]     Current Problem  Problem List Items Addressed This Visit           ICD-10-CM    Right ankle injury, subsequent encounter S99.911D    High ankle sprain, right, subsequent encounter S93.491D    Complete tear of ligament of ankle S93.409A    Partial tear of ligament of lateral aspect of ankle S93.499A    Peroneus brevis tendinitis, right M76.71    Peroneus longus tendinitis, right M76.71    Sprain of right ankle, subsequent encounter S93.401D    Asymptomatic varicose veins I83.90     Other Visit Diagnoses         Codes      Nondisplaced comminuted fracture of shaft of left fibula, subsequent encounter for closed fracture with routine healing     S82.455D    s/p 6 weeks tall XP walking boot and crutches             Precautions   WBAT in ankle brace.    Pain  Pain Assessment: 0-10  0-10 (Numeric) Pain Score: 0 - No pain    Subjective  Ankle has been feeling pretty good, some pain present around R knee from vein procedure. He has been able to walk without crutch in calm environments on flat surfaces.    Objective  Walks carrying crutch, mild decrease in R heel strike and toe off     Treatment:  Therapeutic Exercise  Therapeutic Exercise Performed: Yes  Upright Bike L1 x 4'     Supine:  B knee flexion green ball x15  SLR 2x10    Standing:  MoFlex calf stretch 2x30\" ea   DL heel raise x15   Fwd step up x10 ea 6\"   Step up and over x10 6\"   Band TKE x10 ea 10# band  Rocker board x10 AP, s/s     Seated hamstring stretch 3x30\"    Current " HEP:  Calf Stretch  TKE into ball  Review of previous home program.    Has patient been consistent with performance of HEP? Yes    Assessment:  Pt's response to treatment:  Strength and mobility progressed this date. Pt felt intermittent knee pain but no lingering symptoms. Gait pattern improving consistently.   Areas of improvements:  pain levels   Limitations/deficits:  some gait deviation    Pain end of session: 0/10    Plan:     Continue with current POC/no changes    Assessment of current progress against goals:  Progressing toward functional goals    Goals:  SHORT TERM GOALS  1) Pt will decrease pain from 3/10 to 0/10 for improved activity tolerance  2) Pt will improve right ankle AROM to FULL to facilitate improved functional mobility without limitation    3) Pt will demonstrate x30 L SLR without quad lag to reflect improvement in quadriceps muscular endurance and increased ease with bed mobility/car transfers  4) Pt will demonstrate painfree, symmetrical sit to stand to/from chair without UE support to improve transfers  5) Pt will be independent with HEP.     LONG TERM GOALS  1) Pt will demonstrate independence with HEP for self management of condition  2) Pt will improve Right ankle DF, PF, INV, EVER and  hip flexion and knee flexion/extension strength to 5/5 for improved standing and walking tolerance  3) Pt will improve gait to independent without assistive device and normalized mechanics for improved ambulation  4) Pt will improve stair negotiation to reciprocal without UE support for improved home/community navigation     5) SLS to 10 seconds or more.

## 2025-05-01 ENCOUNTER — APPOINTMENT (OUTPATIENT)
Dept: PHYSICAL THERAPY | Facility: CLINIC | Age: 70
End: 2025-05-01
Payer: MEDICARE

## 2025-05-07 ENCOUNTER — TREATMENT (OUTPATIENT)
Dept: PHYSICAL THERAPY | Facility: CLINIC | Age: 70
End: 2025-05-07
Payer: MEDICARE

## 2025-05-07 DIAGNOSIS — S93.491D HIGH ANKLE SPRAIN, RIGHT, SUBSEQUENT ENCOUNTER: ICD-10-CM

## 2025-05-07 DIAGNOSIS — S93.499A PARTIAL TEAR OF LIGAMENT OF LATERAL ASPECT OF ANKLE: ICD-10-CM

## 2025-05-07 DIAGNOSIS — M76.71 PERONEUS LONGUS TENDINITIS, RIGHT: ICD-10-CM

## 2025-05-07 DIAGNOSIS — S93.401D SPRAIN OF RIGHT ANKLE, SUBSEQUENT ENCOUNTER: ICD-10-CM

## 2025-05-07 DIAGNOSIS — S99.911D RIGHT ANKLE INJURY, SUBSEQUENT ENCOUNTER: ICD-10-CM

## 2025-05-07 DIAGNOSIS — S82.455D NONDISPLACED COMMINUTED FRACTURE OF SHAFT OF LEFT FIBULA, SUBSEQUENT ENCOUNTER FOR CLOSED FRACTURE WITH ROUTINE HEALING: ICD-10-CM

## 2025-05-07 DIAGNOSIS — M76.71 PERONEUS BREVIS TENDINITIS, RIGHT: ICD-10-CM

## 2025-05-07 DIAGNOSIS — S93.409A COMPLETE TEAR OF LIGAMENT OF ANKLE: ICD-10-CM

## 2025-05-07 DIAGNOSIS — I83.90 ASYMPTOMATIC VARICOSE VEINS: ICD-10-CM

## 2025-05-07 PROCEDURE — 97110 THERAPEUTIC EXERCISES: CPT | Mod: GP

## 2025-05-07 ASSESSMENT — PAIN SCALES - GENERAL: PAINLEVEL_OUTOF10: 0 - NO PAIN

## 2025-05-07 ASSESSMENT — PAIN - FUNCTIONAL ASSESSMENT: PAIN_FUNCTIONAL_ASSESSMENT: 0-10

## 2025-05-07 NOTE — PROGRESS NOTES
"    Physical Therapy Treatment    Patient Name: Kiel Cantrell  MRN: [id number]  Encounter date:  5/7/2025  Time Calculation  Start Time: 1102  Stop Time: 1145  Time Calculation (min): 43 min     PT Therapeutic Procedures Time Entry  Therapeutic Exercise Time Entry: 43       Visit Number:  3 (including evaluation)  Planned total visits:    Visits Authorized/Insurance Coverage:  NO AUTH, 96% [place], [employer] [facility]   01/02/2025: NO AUTH, 96% [place] O[place], [employer] [facility]     Current Problem  Problem List Items Addressed This Visit           ICD-10-CM    Right ankle injury, subsequent encounter S99.911D    High ankle sprain, right, subsequent encounter S93.491D    Complete tear of ligament of ankle S93.409A    Partial tear of ligament of lateral aspect of ankle S93.499A    Peroneus brevis tendinitis, right M76.71    Peroneus longus tendinitis, right M76.71    Sprain of right ankle, subsequent encounter S93.401D    Asymptomatic varicose veins I83.90     Other Visit Diagnoses         Codes      Nondisplaced comminuted fracture of shaft of left fibula, subsequent encounter for closed fracture with routine healing     S82.455D    s/p 6 weeks tall XP walking boot and crutches               Precautions   WBAT in ankle brace.    Pain  Pain Assessment: 0-10  0-10 (Numeric) Pain Score: 0 - No pain    Subjective  Ankle has been feeling pretty good but R knee still painful. He felt pretty good after last visit.     Objective  Mild decrease in R stance time ambulating into clinic     Treatment:  Therapeutic Exercise  Therapeutic Exercise Performed: Yes  Supine:  B knee flexion green ball x15  SLR 2x10    Standing:  MoFlex calf stretch 2x30\" ea   DL heel raise x15   Fwd step up x10 ea 6\"   Step up and over x10 6\"   Band TKE x10 ea 10# band  Mini squat x10   March in place x10 ea     Seated hamstring stretch 3x30\"    Current HEP:  Calf Stretch  TKE into ball  Review of previous home program.    Has patient been " consistent with performance of HEP? Yes    Assessment:  Pt's response to treatment:  Pt still had some knee pain today but improved with mobility. Gait pattern improving with reduced deviation during periods of R SLS.   Areas of improvements: better quality of mobility  Limitations/deficits: knee pain     Pain end of session: 0/10    Plan:     Continue with current POC/no changes    Assessment of current progress against goals:  Progressing toward functional goals    Goals:  SHORT TERM GOALS  1) Pt will decrease pain from 3/10 to 0/10 for improved activity tolerance  2) Pt will improve right ankle AROM to FULL to facilitate improved functional mobility without limitation    3) Pt will demonstrate x30 L SLR without quad lag to reflect improvement in quadriceps muscular endurance and increased ease with bed mobility/car transfers  4) Pt will demonstrate painfree, symmetrical sit to stand to/from chair without UE support to improve transfers  5) Pt will be independent with HEP.     LONG TERM GOALS  1) Pt will demonstrate independence with HEP for self management of condition  2) Pt will improve Right ankle DF, PF, INV, EVER and  hip flexion and knee flexion/extension strength to 5/5 for improved standing and walking tolerance  3) Pt will improve gait to independent without assistive device and normalized mechanics for improved ambulation  4) Pt will improve stair negotiation to reciprocal without UE support for improved home/community navigation     5) SLS to 10 seconds or more.

## 2025-05-13 NOTE — PROGRESS NOTES
"    Physical Therapy Treatment    Patient Name: Kiel Cantrell  MRN: [id number]  Encounter date:  5/14/2025  Time Calculation  Start Time: 1515  Stop Time: 1558  Time Calculation (min): 43 min     PT Therapeutic Procedures Time Entry  Manual Therapy Time Entry: 20  Therapeutic Exercise Time Entry: 23       Visit Number:  4 (including evaluation)  Planned total visits:    Visits Authorized/Insurance Coverage:  NO AUTH, 96% [place], [employer] [facility]   01/02/2025: NO AUTH, 96% [place], [employer] [facility]     Current Problem  Problem List Items Addressed This Visit           ICD-10-CM    Right ankle injury, subsequent encounter S99.911D    High ankle sprain, right, subsequent encounter S93.491D    Complete tear of ligament of ankle S93.409A    Partial tear of ligament of lateral aspect of ankle S93.499A    Peroneus brevis tendinitis, right M76.71    Peroneus longus tendinitis, right M76.71    Sprain of right ankle, subsequent encounter S93.401D    Asymptomatic varicose veins I83.90     Other Visit Diagnoses         Codes      Nondisplaced comminuted fracture of shaft of left fibula, subsequent encounter for closed fracture with routine healing     S82.455D    s/p 6 weeks tall XP walking boot and crutches             Precautions   WBAT in ankle brace.    Pain  Pain Assessment: 0-10  0-10 (Numeric) Pain Score: 1    Subjective  Pt had onset of severe pain in R knee lasting for a few days after last visit. He had difficulty walking following. He feels he did too much WB.     Objective  Hypomobile R tibiofemoral joint    Treatment:  Manual Therapy  Manual Therapy Performed: Yes  Joint mobilizations: tibiofemoral AP, PA grade IV, patellofemoral mobs all planes    Therapeutic Exercise  Therapeutic Exercise Performed: Yes  Supine:  B knee flexion green ball x15  DL bridge green ball x15  Quad set x10 5\" hold  SLR 2x10    Seated hamstring stretch 3x30\"    Current HEP:  Calf Stretch  TKE into ball  Review of previous " home program.    Has patient been consistent with performance of HEP? Yes    Assessment:  Pt's response to treatment: Treatment focused on knee to address pain and dysfunction. Intensity kept very light to avoid flare up. Pt without pain at exit.   Areas of improvements: symptom relief today   Limitations/deficits: mobility tolerance     Pain end of session: 0/10    Plan:     Continue with current POC/no changes    Assessment of current progress against goals:  Progressing toward functional goals    Goals:  SHORT TERM GOALS  1) Pt will decrease pain from 3/10 to 0/10 for improved activity tolerance  2) Pt will improve right ankle AROM to FULL to facilitate improved functional mobility without limitation    3) Pt will demonstrate x30 L SLR without quad lag to reflect improvement in quadriceps muscular endurance and increased ease with bed mobility/car transfers  4) Pt will demonstrate painfree, symmetrical sit to stand to/from chair without UE support to improve transfers  5) Pt will be independent with HEP.     LONG TERM GOALS  1) Pt will demonstrate independence with HEP for self management of condition  2) Pt will improve Right ankle DF, PF, INV, EVER and  hip flexion and knee flexion/extension strength to 5/5 for improved standing and walking tolerance  3) Pt will improve gait to independent without assistive device and normalized mechanics for improved ambulation  4) Pt will improve stair negotiation to reciprocal without UE support for improved home/community navigation     5) SLS to 10 seconds or more.

## 2025-05-14 ENCOUNTER — TREATMENT (OUTPATIENT)
Dept: PHYSICAL THERAPY | Facility: CLINIC | Age: 70
End: 2025-05-14
Payer: MEDICARE

## 2025-05-14 DIAGNOSIS — S93.491D HIGH ANKLE SPRAIN, RIGHT, SUBSEQUENT ENCOUNTER: ICD-10-CM

## 2025-05-14 DIAGNOSIS — S93.401D SPRAIN OF RIGHT ANKLE, SUBSEQUENT ENCOUNTER: ICD-10-CM

## 2025-05-14 DIAGNOSIS — I83.90 ASYMPTOMATIC VARICOSE VEINS: ICD-10-CM

## 2025-05-14 DIAGNOSIS — S99.911D RIGHT ANKLE INJURY, SUBSEQUENT ENCOUNTER: ICD-10-CM

## 2025-05-14 DIAGNOSIS — S93.409A COMPLETE TEAR OF LIGAMENT OF ANKLE: ICD-10-CM

## 2025-05-14 DIAGNOSIS — M76.71 PERONEUS LONGUS TENDINITIS, RIGHT: ICD-10-CM

## 2025-05-14 DIAGNOSIS — S82.455D NONDISPLACED COMMINUTED FRACTURE OF SHAFT OF LEFT FIBULA, SUBSEQUENT ENCOUNTER FOR CLOSED FRACTURE WITH ROUTINE HEALING: ICD-10-CM

## 2025-05-14 DIAGNOSIS — S93.499A PARTIAL TEAR OF LIGAMENT OF LATERAL ASPECT OF ANKLE: ICD-10-CM

## 2025-05-14 DIAGNOSIS — M76.71 PERONEUS BREVIS TENDINITIS, RIGHT: ICD-10-CM

## 2025-05-14 PROCEDURE — 97110 THERAPEUTIC EXERCISES: CPT | Mod: GP

## 2025-05-14 PROCEDURE — 97140 MANUAL THERAPY 1/> REGIONS: CPT | Mod: GP

## 2025-05-14 ASSESSMENT — PAIN - FUNCTIONAL ASSESSMENT: PAIN_FUNCTIONAL_ASSESSMENT: 0-10

## 2025-05-14 ASSESSMENT — PAIN SCALES - GENERAL: PAINLEVEL_OUTOF10: 1

## 2025-05-20 ENCOUNTER — TREATMENT (OUTPATIENT)
Dept: PHYSICAL THERAPY | Facility: CLINIC | Age: 70
End: 2025-05-20
Payer: MEDICARE

## 2025-05-20 DIAGNOSIS — M76.71 PERONEUS BREVIS TENDINITIS, RIGHT: ICD-10-CM

## 2025-05-20 DIAGNOSIS — S93.499A PARTIAL TEAR OF LIGAMENT OF LATERAL ASPECT OF ANKLE: ICD-10-CM

## 2025-05-20 DIAGNOSIS — S93.401D SPRAIN OF RIGHT ANKLE, SUBSEQUENT ENCOUNTER: ICD-10-CM

## 2025-05-20 DIAGNOSIS — I83.90 ASYMPTOMATIC VARICOSE VEINS: ICD-10-CM

## 2025-05-20 DIAGNOSIS — S93.409A COMPLETE TEAR OF LIGAMENT OF ANKLE: ICD-10-CM

## 2025-05-20 DIAGNOSIS — M76.71 PERONEUS LONGUS TENDINITIS, RIGHT: ICD-10-CM

## 2025-05-20 DIAGNOSIS — S99.911D RIGHT ANKLE INJURY, SUBSEQUENT ENCOUNTER: ICD-10-CM

## 2025-05-20 DIAGNOSIS — S93.491D HIGH ANKLE SPRAIN, RIGHT, SUBSEQUENT ENCOUNTER: ICD-10-CM

## 2025-05-20 DIAGNOSIS — S82.455D NONDISPLACED COMMINUTED FRACTURE OF SHAFT OF LEFT FIBULA, SUBSEQUENT ENCOUNTER FOR CLOSED FRACTURE WITH ROUTINE HEALING: ICD-10-CM

## 2025-05-20 PROCEDURE — 97140 MANUAL THERAPY 1/> REGIONS: CPT | Mod: GP

## 2025-05-20 PROCEDURE — 97110 THERAPEUTIC EXERCISES: CPT | Mod: GP

## 2025-05-20 ASSESSMENT — PAIN SCALES - GENERAL: PAINLEVEL_OUTOF10: 1

## 2025-05-20 ASSESSMENT — PAIN - FUNCTIONAL ASSESSMENT: PAIN_FUNCTIONAL_ASSESSMENT: 0-10

## 2025-05-20 NOTE — PROGRESS NOTES
"    Physical Therapy Treatment    Patient Name: Kiel Cantrell  MRN: [id number]  Encounter date:  5/20/2025  Time Calculation  Start Time: 0816  Stop Time: 0859  Time Calculation (min): 43 min     PT Therapeutic Procedures Time Entry  Manual Therapy Time Entry: 20  Therapeutic Exercise Time Entry: 23       Visit Number:  5 (including evaluation)  Planned total visits:    Visits Authorized/Insurance Coverage:  NO AUTH, 96% [place], [employer] [facility]   01/02/2025: NO AUTH, 96% [place], [employer] [facility]     Current Problem  Problem List Items Addressed This Visit           ICD-10-CM    Right ankle injury, subsequent encounter S99.911D    High ankle sprain, right, subsequent encounter S93.491D    Complete tear of ligament of ankle S93.409A    Partial tear of ligament of lateral aspect of ankle S93.499A    Peroneus brevis tendinitis, right M76.71    Peroneus longus tendinitis, right M76.71    Sprain of right ankle, subsequent encounter S93.401D    Asymptomatic varicose veins I83.90     Other Visit Diagnoses         Codes      Nondisplaced comminuted fracture of shaft of left fibula, subsequent encounter for closed fracture with routine healing     S82.455D    s/p 6 weeks tall XP walking boot and crutches               Precautions   WBAT in ankle brace.    Pain  Pain Assessment: 0-10  0-10 (Numeric) Pain Score: 1    Subjective  Pt has had significantly less pain over the past week. He did yard work yesterday and has some soreness as a result but no sharp pain.     Objective  Mild to moderate joint hypomobility R tibiofemoral joint.     Treatment:  Manual Therapy  Manual Therapy Performed: Yes  Joint mobilizations: tibiofemoral AP, PA grade IV, patellofemoral mobs all planes    Therapeutic Exercise  Therapeutic Exercise Performed: Yes  Supine:  B knee flexion green ball x15  DL bridge green ball x15  Quad set x10 5\" hold  SLR 2x10    Seated hamstring stretch 3x30\"    Current HEP:  Calf Stretch  TKE into " ball  Review of previous home program.    Has patient been consistent with performance of HEP? Yes    Assessment:  Pt's response to treatment: Pt with intermittent knee soreness today but overall again had decrease in stiffness and tightness with treatment. Discussed with pt how proper footwear while doing yardwork may help mitigate soreness.  Areas of improvements: symptom relief with treatment  Limitations/deficits: limited R knee ext    Pain end of session: 0/10    Plan:     Continue with current POC/no changes    Assessment of current progress against goals:  Progressing toward functional goals    Goals:  SHORT TERM GOALS  1) Pt will decrease pain from 3/10 to 0/10 for improved activity tolerance  2) Pt will improve right ankle AROM to FULL to facilitate improved functional mobility without limitation    3) Pt will demonstrate x30 L SLR without quad lag to reflect improvement in quadriceps muscular endurance and increased ease with bed mobility/car transfers  4) Pt will demonstrate painfree, symmetrical sit to stand to/from chair without UE support to improve transfers  5) Pt will be independent with HEP.     LONG TERM GOALS  1) Pt will demonstrate independence with HEP for self management of condition  2) Pt will improve Right ankle DF, PF, INV, EVER and  hip flexion and knee flexion/extension strength to 5/5 for improved standing and walking tolerance  3) Pt will improve gait to independent without assistive device and normalized mechanics for improved ambulation  4) Pt will improve stair negotiation to reciprocal without UE support for improved home/community navigation     5) SLS to 10 seconds or more.

## 2025-05-23 NOTE — PROGRESS NOTES
"    Physical Therapy Treatment    Patient Name: Kiel Cantrell  MRN: [id number]  Encounter date:  5/27/2025  Time Calculation  Start Time: 0815  Stop Time: 0856  Time Calculation (min): 41 min     PT Therapeutic Procedures Time Entry  Manual Therapy Time Entry: 18  Therapeutic Exercise Time Entry: 23       Visit Number:  6 (including evaluation)  Planned total visits:    Visits Authorized/Insurance Coverage:  NO AUTH, 96[place], [place] OOP, [employer] M[place]   01/02/2025: NO AUTH, [place] 1500 O[place], [employer] [facility]     Current Problem  Problem List Items Addressed This Visit           ICD-10-CM    Right ankle injury, subsequent encounter S99.911D    High ankle sprain, right, subsequent encounter S93.491D    Complete tear of ligament of ankle S93.409A    Partial tear of ligament of lateral aspect of ankle S93.499A    Peroneus brevis tendinitis, right M76.71    Peroneus longus tendinitis, right M76.71    Sprain of right ankle, subsequent encounter S93.401D    Asymptomatic varicose veins I83.90     Other Visit Diagnoses         Codes      Nondisplaced comminuted fracture of shaft of left fibula, subsequent encounter for closed fracture with routine healing     S82.455D    s/p 6 weeks tall XP walking boot and crutches             Precautions   WBAT in ankle brace.    Pain  Pain Assessment: 0-10  0-10 (Numeric) Pain Score: 2    Subjective  Ankle has been feeling pretty good with intermittent soreness. Knee pain has been on and off but no sharp pain since a few weeks ago. Recent PT has been helpful.    Objective  Decreased R TKE throughout stance with compensatory R trunk lean    Treatment:  Manual Therapy  Manual Therapy Performed: Yes  Joint mobilizations: tibiofemoral AP, PA grade IV, patellofemoral mobs all planes    Therapeutic Exercise  Therapeutic Exercise Performed: Yes  Supine:  B knee flexion green ball x15  DL bridge green ball x15  Quad set x10 5\" hold  SLR 2x10    Seated hamstring stretch 3x30\"    Standing:  DL " heel raise x10   Hamstring curl x10   March x10     Current HEP:  Calf Stretch  TKE into ball  Review of previous home program.    Has patient been consistent with performance of HEP? Yes    Assessment:  Pt's response to treatment: Pt again with some relief in stiffness with joint mobilizations but remains with functional ROM deficits evident when walking. Pt tolerated light standing exercise well today without onset of pain.   Areas of improvements: less severe pain over past two weeks   Limitations/deficits: gait pattern     Pain end of session: 0/10    Plan:     Continue with current POC/no changes    Assessment of current progress against goals:  Progressing toward functional goals    Goals:  SHORT TERM GOALS  1) Pt will decrease pain from 3/10 to 0/10 for improved activity tolerance  2) Pt will improve right ankle AROM to FULL to facilitate improved functional mobility without limitation    3) Pt will demonstrate x30 L SLR without quad lag to reflect improvement in quadriceps muscular endurance and increased ease with bed mobility/car transfers  4) Pt will demonstrate painfree, symmetrical sit to stand to/from chair without UE support to improve transfers  5) Pt will be independent with HEP.     LONG TERM GOALS  1) Pt will demonstrate independence with HEP for self management of condition  2) Pt will improve Right ankle DF, PF, INV, EVER and  hip flexion and knee flexion/extension strength to 5/5 for improved standing and walking tolerance  3) Pt will improve gait to independent without assistive device and normalized mechanics for improved ambulation  4) Pt will improve stair negotiation to reciprocal without UE support for improved home/community navigation     5) SLS to 10 seconds or more.

## 2025-05-23 NOTE — PROGRESS NOTES
"Verbal consent of the patient and/or verbal parental consent for patients under the age of 18 have been obtained to conduct a physical examination at this office visit.    Established patient  History Of Present Illness  05/23/25 Kiel Cantrell is a 69 y.o. male who presents for a follow up of their Right ankle fracture s/p 24 weeks(173 days) with ankle brace.     All previous Progress Notes and imaging results related to this patients chief complaint have been reviewed in preparation for this examination.    Past Medical History  He has a past medical history of Acute deep vein thrombosis (DVT) of calf muscle vein of left lower extremity (Multi) (11/08/2023), HL (hearing loss), Unspecified atrial fibrillation (Multi) (03/27/2014), and Visual impairment.    Surgical History  He has a past surgical history that includes Vasectomy and Eye surgery.     Social History  He reports that he has never smoked. He has never been exposed to tobacco smoke. He has never used smokeless tobacco. He reports current alcohol use of about 3.0 standard drinks of alcohol per week. He reports that he does not use drugs.    Family History  Family History   Problem Relation Name Age of Onset    Early natural death Mother Nara Cantrell     Miscarriages / Stillbirths Mother Nara Cantrell     Lung disease Father Nolan Cantrell     COPD Father Nolan Cantrell     No Known Problems Sister      No Known Problems Daughter      No Known Problems Son      Cancer Paternal Grandmother Supriya Cantrell      History Of Present Illness  12/9/24 Kiel Cantrell is a 69 y.o. male presenting with RIGHT ankle pain. Patient reports he fell on ice in the driveway last Tuesday and the R ankle was \"locked in place\" and could not move, and was forced in to inversion. Patient stated his R knee hurt more than his ankle at first, but he first noticed the ankle pain when he stood on it when he got up. Patient relays he attempted to move his R ankle to relieve the pain as he had " "with previous ankle injuries noting it did not decrease.  Patient went to an urgent care location 12/8/24, had X-rays, and was provided a walking boot. Patient has been using crutches he had at home for a past knee surgery. Patient came into the appointment without the walking boot on because it is too painful due to swelling. Patient reports 3/10 at rest, noting it was 8/10 at the time of the injury. Patient states the pain has been gradually improving, but still present with weight bearing.  We reviewed patient x-ray results in detail.  Patient x-rays show a spiral fracture of the distal fibula as well as significant swelling.  Upon exam patient has indications of a high ankle sprain particularly over the syndesmotic ligaments as well as medial and lateral ankle sprain and marked tenderness over the talus with concern for occult fracture.  As such after discussion due to patient's physical exam and due to the nature of the injury we will order an MRI to evaluate for underlying soft tissue injury and concern for need for referral to orthopedic surgery for syndesmotic repair.  We will have patient continue with his walking boot and will be nonweightbearing in a pair of crutches.  Patient is to not drive.  We can follow-up after MRI and adjust treatment as indicated at that time.  Patient and spouse verbalized understanding and agreement with plan of care.  ----------------------------------------------------------  12/23/24 Kiel Cantrell is a 69 y.o. male presenting for his MRI follow up of his RIGHT ankle. Since last visit in office, patient states that he feels improved. Presents using crutches and wearing tall walking boot as previously instructed. Rates current pain as a 1/10 that is \"every so often\".  We reviewed patient MRI results in detail.  Patient and spouse verbalized understanding of results.  We discussed treatment options and will have patient continue with walking boot and crutches as previously " prescribed as well as physical therapy.  Patient will also continue with calcium and vitamin D as previously recommended.  We can reevaluate in 4 weeks for mery-ray and adjust treatment as indicated at that time.  Patient does not warrant surgery at this time but we will reevaluate at a future appointment and if patient continues to have significant pain after completing therapy and complete resolution of his fracture we can consider referral to surgery for reevaluation.  Patient and spouse verbalized understanding and agreement with plan of care.   ----------------------------------------------------------  01/14/25 Kiel Cantrell is a 69 y.o. male who presents for a follow up of their Right ankle fracture s/p 6 weeks tall XP walking boot and crutches.  States that he is having 1/10 pain today with prolonged walking.  Still has a significant amount of swelling in his lower legs both legs obviously the 1 that fractured has some more swelling.  X-ray shows that sclerosis is noted however he does have significant varicose veins and venous insufficiency so we will get him into see Dr. Richmond.  Also stressed to him the importance of continuing to take his calcium as well as his vitamin D and the supplement  Symptum to help with bony healing.  I told him he should should still  use crutches while in the walking boot and additionally gave him an even up to take pressure off of his knees.  Additionally we discussed his MRI once again and all of the ligaments and tendons that he injured.. He was seen in by Mikel Estrada in [place]. No new or worsening symptoms.  We also talked about regenerative injections in the future if needed.   ----------------------------------------------------------  02/10/25 Kiel Cantrell is a 69 y.o. male who presents for a follow up of their Right ankle fracture s/p 10 weeks tall XP walking boot and crutches. Patient reports he feels his RIGHT ankle is improving, noting his RIGHT lower extremity is  "\"getting stronger\" and he is able to put more weight on it during PT. Patient states he has been compliant with the HEP provided at therapy, noting he uses a kitchen counter for support as needed. Patient states he has been decreasing crutch use, but is still dependant on them to decrease the weight and pressure on the RIGHT lower extremity. Patient came into the appointment without the walking boot on, stating he had PT this morning and had a Re-xray of the RIGHT ankle prior to the appointment and wanted to avoid the inconvenience of taking the boot of multiple times. Patient relays he has been walking short distances around his house like going to the bathroom without the boot alternating between crutch or counter support, stating he wears the boot and uses crutches for partial weight bearing as tolerated in public. Patient denies RIGHT ankle pain at the time of the appointment, reporting brief \"stinging\" intermittently throughout the day and relaying the weight of a shoe can cause pain if he is sitting with his RIGHT leg elevated with his RIGHT foot hanging off the edge of the foot rest. Patient relays the pain is resolved with ankle movement. Patient denies using NSAIDs or Tylenol or other interventions to reduce the pain and symptoms. Patient states he has been taking the calcium and vitamin D supplements as recommended and a supplement recommended by his varicose vein specialist Dr. Richmond at the last appointment.  I told him based off of his x-ray results if he is going to be going and walking long periods of ways are outside he should still be wearing the walking boot and crutches.  However if he has to get up to go to the bathroom off of the couch it is okay for him to go without the walking boot.   ------------------------------------------------------  03/31/25 Kiel Cantrell is a 69 y.o. male who presents for a follow up of their Right ankle fracture s/p 16 weeks(112 days) tall XP walking boot and " "crutches. Patient reports decreased R ankle pain, noting his \"strength is getting better\", and he is getting to the point of \"putting less weight on the crutches\" for support. Patient states he has discontinued wearing the walking boot provided because it causes increased R knee pain because the boot decreases AROM flexion and extension. Patient notes he has been walking for short distances around his house, stating he is cautious about re-injuring his RIGHT ankle, and also voices frustration at the length of time it is taking to heal and the limitations he still has because of the injury. Patient notes he had been in physical therapy and has been compliant with his HEP. Patient reports 1/10 pain at the time of the appointment, noting it can increase to 4-5/10. Patient notes he sits and rests, and takes the boot off if that is the cause of the pain, when the RIGHT knee and ankle pain increases. Patient elaborates that he can develop a burning ache in the RIGHT ankle if it is held in one position for prolonged periods of time, noting it resolves with ankle circles and moving the RIGHT ankle.  I told him not I want him to get back into physical therapy even though he is doing all his exercises and stuff at home.  Also I told him based off of what I see on the x-ray it looks like now are dealing with a nonunion fracture there were going to get a hold of the bracing representative [employer] May to see if we can get him a bone stimulator.  We are going to transition him out of the walking boot into a stable ankle brace.  They are going to continue to work with Dr. Richmond who is going to do some laser work on his vasculature sooner than later which will help his healing process as well.      Allergies  Cat dander    Review of Systems  CONSTITUTIONAL:   Negative for weight change, loss of appetite, fatigue, weakness, fever, chills, night sweats, headaches .           HEENT:   Negative for cold, cough, sore throat, sinus " pain, swollen lymph nodes.           OPHTHALMOLOGY:   Negative for diminished vision, blurred vision, loss of vision, double vision.           ALLERGY:   Negative for runny nose, scratchy throat, sinus congestion, rash, facial pressure, nasal congestion, post-nasal drip.           CARDIOLOGY:   Negative for chest pain, palpitations, murmurs, irregular heart beat, shortness of breath, leg edema, dyspnea on exertion, fatigue, dizziness.           RESPIRATORY:   Negative for chest pain, shortness of breath, swelling of the legs, asthma/copd, chest congestion, pain with breathing .           GASTROENTEROLOGY:   Negative for nausea, vomitting, heartburn, constipation, diarrhea, blood in stool, change in bowel habits, black stool.           HEMATOLOGY/LYMPH:   Negative for fatigue, loss of appetitie, easy bruising, easy bleeding, anemia, abnormal bleeding, slow healing.           ENDOCRINOLOGY:   Negative for polyuria, polydipsia, polyphagia, fatigue, weight loss, weight gain, cold intolerance, heat intolerance, diabetes.           MUSCULOSKELETAL:   Positive  for Right ankle        DERMATOLOGY:   Negative for rash, bruising.           NEUROLOGY:   Negative for tingling, numbness, gait abnormality, paresthesias, weakness, sciatica.        Examination:   All findings improved since previous visit  Right Ankle and Foot  Edema: Negative  Ecchymosis/Bruising: Negative   percussion Test: Negative  Tuning Fork Test: Negative     Orientation: All findings improved since previous visit  Positive  Asymmetrical, because of RIGHT ankle starting to atrophy         ROM: All findings improved since previous visit  Positive, no longer pain with plantarflexion and dorsiflexion combined with inversion and eversion and now full range of motion just complains of a little bit of stiffness           Muscle Strength: All findings improved since previous visit  +5/+5 Planar Flexion,  +5/+5  Dorsi Flexion    +5/+5 Inversion   +5/+5 Eversion       +5/+5 Plantarflexion in combination with inversion  +5/+5 Plantarflexion in combination with eversion         +5/+5 Dorsiflexion in combination with inversion (Posterior Tibialis)  +5/+5 Dorsiflexion in combination with eversion (Peroneal Brevis)   +5/+5 Dorsiflexion in combination with eversion and flexion of great toe (Peroneal Longus).      Palpation: All findings improved since previous visit  Positive, still very slight tender to Palpation Right lateral greater than medial malleolus, as well as distal fibula and over the ATF and CF ligament and anterior inferior tib-fib ligament           Vascular:   +2/+4 Dorsalis Pedis  +2/+4 Posterior Tibialis  Capillary Refill < 2 Seconds.        Leg/Ankle/Foot - Ankle Impingement:  Posterior Ankle Impingement Test: Negative.   Anterior Ankle Impingement Test: Negative.          Leg/Ankle/Foot - Ankle Instability:All findings improved since previous visit  Flexibility Test: Negative.  Anterior Drawer Test:   Positive.  Continued improvement   talar Tilt Test:  Negative.   External Rotation Kleiger Plantar Flexion Test: Negative  External Rotation Kleiger Dorsiflexion Test: Negative   squeeze Test: Negative   dorsiflexion Test:  Negative.   Posterior Tibial Instability Test: Negative.  Peroneal Tendon Instability Test:  Negative.  Horizontal Squeeze Test: Negative   vertical Squeeze Test: Negative.   Standing/Walking Test: Negative        Leg/Ankle/Foot - DVT:  Maikol's Sign: Negative.          Leg/Ankle/Foot - Forefoot:  Strunsky Test:  Negative.   Gaenslen Maneuver Test: Negative.   Metatarsal Tap Test: Negative  Crepitation Test: Negative.          Leg/Ankle/Foot - Hindfoot Achilles/Calcaneus:  Salazar Compression Test: Negative.   Hoffa Sign: Negative.   Achilles Tendon Tap Test: Negative.   Heel Compression Test: Negative.          Leg/Ankle/Foot - Nerve Irritation:  Sahara Sign: Negative.   Digital Nerve Stretch Test: Negative.   Tinel Sign: Negative.   Tourniquet  Sign: Negative.          Feet/Foot:   Positive BILATERAL  Valgus foot      Imaging and Diagnostics Review:    My personal overview of the new x-rays of the right ankle compared to the older x-rays show once again over the past 3 x-rays no signs of any significant bony sclerosis and change.  What looks to be no significant sclerosis or bony healing noted and still some effusion noted consistent with nonunion/malunion    XR ANKLE RIGHT 3+ VIEWS; ;  3/17/2025 3:08 pm      ORDERING CLINICIAN:  MARYANNE FRITZ      FINDINGS:  Right ankle, three views      Subacute healing fracture through the distal left fibula with  increased callus formation sclerosis. No malalignment seen. There is  a small avulsion injury at the dorsal aspect of the talus, also  unchanged.      IMPRESSION:  Subacute healing distal fibular fracture deformity without change in  alignment. Tiny avulsion injury at the dorsal aspect of the talus  again seen        Signed by: Sai Pham 3/18/2025 8:55 PM    ---------------------------------------------  XR ANKLE RIGHT 3+ VIEWS; ;  2/10/2025 3:50 pm      ORDERING CLINICIAN:  MARYANNE FRITZ      FINDINGS:  Right ankle, three views      There is a subacute healing distal fibular fracture deformity with  increased callus formation. No new fracture seen. There is  bimalleolar soft tissue edema. The ankle mortise is maintained      IMPRESSION:  Subacute healing distal fibular fracture deformity without change in  alignment    Signed by: Sai Pham 2/11/2025 6:15 PM  -------------------------------------------------------------------------------  Interpreted By:  Elvira Pineda and Stephens Katherine   STUDY: MRI of the right ankle without IV contrast;  12/19/2024 9:05 am      ORDERING CLINICIAN: APRIL DRIVER      FINDINGS:  TENDONS:  The extensor tendons are intact and demonstrate a normal course. Mild tendinosis and tenosynovitis of the posterior tibial and flexor  hallucis longus tendons.  Moderate tendinosis of the peroneus longus tendon. Split tear of the peroneus brevis tendon with distal  reconstitution. The Achilles tendon is intact with mild tendinosis.  The plantar aponeurosis is intact.      LIGAMENTS:  High-grade partial-thickness tear of the anterior inferior tibiofibular ligament with increased attenuation of the central  fibers. Nonvisualization of the anterior talofibular ligament consistent with tear. High-grade sprain of the calcaneofibular  ligament. The posterior talofibular ligament is intact. The posterior tibiofibular ligament is intact. The deep and superficial components  of the deltoid ligament are intact. The spring ligament is intact.      JOINTS:  There is no dislocation. There is no joint effusion. The articular cartilage of the ankle joint is normal. There is no osteochondral  defect in the talar dome.      OSSEOUS STRUCTURES:  Re-demonstration of a subacute nondisplaced distal fibular fracture.  There is no marrow replacing lesion. A benign cyst is noted in the lateral cuneiform.      SOFT TISSUES:  There is no muscle atrophy or tear.  The tarsal tunnel is normal.  The sinus tarsi is normal with preservation of fat signal.      IMPRESSION MRI of the right ankle December 19, 2024:  1. Nonvisualization of the anterior talofibular ligament consistent with complete tear.  2. High-grade partial-thickness tear of the anterior inferior tibiofibular ligament.  3. Re-demonstration of subacute nondisplaced distal fibular fracture.  4. High-grade sprain of the calcaneofibular ligament.  5. Split tear of the peroneus brevis tendon with distal reconstitution.  6. Moderate peroneus longus, mild posterior tibial and flexor hallucis longus tendinosis.  7. Achilles tendon is intact with mild tendinosis.        I personally reviewed the images/study and I agree with Noemi Freeman DO's (radiology resident) findings as stated. This study  was interpreted at [facility]  [place], [place].      Signed by: Elvira Pineda 12/19/2024 5:31 PM  Dictation workstation:   [id number]    Assessment   No diagnosis found.      Treatment or Intervention:  May continue PRICE therapy as needed.  Start back into physical Therapy 1-2 times a week for 8-10 weeks with manual therapy as well as dry needling and IASTM  Again stressed the importance of wearing shoes with good stability control to help with the biomechanics affecting the lower legs  Again stressed the importance of wearing full foot insoles to help with the biomechanics affecting the lower legs  Continue over-the-counter vitamin-D3 3132-4740+ units a day with food as well as Sympthum and a daily multivitamin  Continue over-the-counter Move Free for joint health  May continue to take OTC Tylenol Extra Strength or OTC Tylenol Arthritis, taking one every 6-8 hours with food as needed for pain management.  Patient advised regarding the risk and/or potential adverse reactions and/or side effects of any prescribed medications along with any over-the-counter medications or any supplements used. Patient advised to seek immediate medical care if any adverse reactions occur. The patient and/or patient(s) parent(s) verbalized their understanding.  Continue even up to balance out to take away knee and hip issues  Continue follow-ups with Dr. Richmond for varicose veins and venous insufficiency  Possibility regenerative injections in the future for his ankle ligaments  Follow up    Please note that this report has been produced using speech recognition software.  It may contain errors related to grammar, punctuation or spelling.  Electronically signed, but not reviewed.  Gal Su D.O. ESVIN, Director of Sports Medicine    SRINIVAS DE [facility]ANTOS on 5/23/25 at 1:01 PM.     ESVIN Jett DO    Benzoyl Peroxide Pregnancy And Lactation Text: This medication is Pregnancy Category C. It is unknown if benzoyl peroxide is excreted in breast milk.

## 2025-05-27 ENCOUNTER — TREATMENT (OUTPATIENT)
Dept: PHYSICAL THERAPY | Facility: CLINIC | Age: 70
End: 2025-05-27
Payer: MEDICARE

## 2025-05-27 DIAGNOSIS — M76.71 PERONEUS BREVIS TENDINITIS, RIGHT: ICD-10-CM

## 2025-05-27 DIAGNOSIS — M76.71 PERONEUS LONGUS TENDINITIS, RIGHT: ICD-10-CM

## 2025-05-27 DIAGNOSIS — S82.455D NONDISPLACED COMMINUTED FRACTURE OF SHAFT OF LEFT FIBULA, SUBSEQUENT ENCOUNTER FOR CLOSED FRACTURE WITH ROUTINE HEALING: ICD-10-CM

## 2025-05-27 DIAGNOSIS — S99.911D RIGHT ANKLE INJURY, SUBSEQUENT ENCOUNTER: ICD-10-CM

## 2025-05-27 DIAGNOSIS — S93.401D SPRAIN OF RIGHT ANKLE, SUBSEQUENT ENCOUNTER: ICD-10-CM

## 2025-05-27 DIAGNOSIS — S93.409A COMPLETE TEAR OF LIGAMENT OF ANKLE: ICD-10-CM

## 2025-05-27 DIAGNOSIS — I83.90 ASYMPTOMATIC VARICOSE VEINS: ICD-10-CM

## 2025-05-27 DIAGNOSIS — S93.491D HIGH ANKLE SPRAIN, RIGHT, SUBSEQUENT ENCOUNTER: ICD-10-CM

## 2025-05-27 DIAGNOSIS — S93.499A PARTIAL TEAR OF LIGAMENT OF LATERAL ASPECT OF ANKLE: ICD-10-CM

## 2025-05-27 PROCEDURE — 97140 MANUAL THERAPY 1/> REGIONS: CPT | Mod: GP

## 2025-05-27 PROCEDURE — 97110 THERAPEUTIC EXERCISES: CPT | Mod: GP

## 2025-05-27 ASSESSMENT — PAIN SCALES - GENERAL: PAINLEVEL_OUTOF10: 2

## 2025-05-27 ASSESSMENT — PAIN - FUNCTIONAL ASSESSMENT: PAIN_FUNCTIONAL_ASSESSMENT: 0-10

## 2025-05-30 ENCOUNTER — APPOINTMENT (OUTPATIENT)
Dept: ORTHOPEDIC SURGERY | Facility: CLINIC | Age: 70
End: 2025-05-30
Payer: MEDICARE

## 2025-06-03 ENCOUNTER — APPOINTMENT (OUTPATIENT)
Dept: PHYSICAL THERAPY | Facility: CLINIC | Age: 70
End: 2025-06-03
Payer: MEDICARE

## 2025-06-19 ENCOUNTER — TREATMENT (OUTPATIENT)
Dept: PHYSICAL THERAPY | Facility: CLINIC | Age: 70
End: 2025-06-19
Payer: MEDICARE

## 2025-06-19 DIAGNOSIS — M76.71 PERONEUS BREVIS TENDINITIS, RIGHT: ICD-10-CM

## 2025-06-19 DIAGNOSIS — M76.71 PERONEUS LONGUS TENDINITIS, RIGHT: ICD-10-CM

## 2025-06-19 DIAGNOSIS — S82.455D NONDISPLACED COMMINUTED FRACTURE OF SHAFT OF LEFT FIBULA, SUBSEQUENT ENCOUNTER FOR CLOSED FRACTURE WITH ROUTINE HEALING: ICD-10-CM

## 2025-06-19 DIAGNOSIS — S99.911D RIGHT ANKLE INJURY, SUBSEQUENT ENCOUNTER: ICD-10-CM

## 2025-06-19 DIAGNOSIS — S93.491D HIGH ANKLE SPRAIN, RIGHT, SUBSEQUENT ENCOUNTER: ICD-10-CM

## 2025-06-19 DIAGNOSIS — I83.90 ASYMPTOMATIC VARICOSE VEINS: ICD-10-CM

## 2025-06-19 DIAGNOSIS — S93.409A COMPLETE TEAR OF LIGAMENT OF ANKLE: ICD-10-CM

## 2025-06-19 DIAGNOSIS — S93.499A PARTIAL TEAR OF LIGAMENT OF LATERAL ASPECT OF ANKLE: ICD-10-CM

## 2025-06-19 DIAGNOSIS — S93.401D SPRAIN OF RIGHT ANKLE, SUBSEQUENT ENCOUNTER: ICD-10-CM

## 2025-06-19 PROCEDURE — 97140 MANUAL THERAPY 1/> REGIONS: CPT | Mod: GP | Performed by: PHYSICAL THERAPIST

## 2025-06-19 PROCEDURE — 97110 THERAPEUTIC EXERCISES: CPT | Mod: GP | Performed by: PHYSICAL THERAPIST

## 2025-06-19 NOTE — PROGRESS NOTES
"    Physical Therapy Treatment    Patient Name: Kiel Cantrell  MRN: [id number]  Encounter date:  6/19/2025  Time Calculation  Start Time: 1345  Stop Time: 1433  Time Calculation (min): 48 min     PT Therapeutic Procedures Time Entry  Therapeutic Exercise Time Entry: 25  Manual Therapy Time Entry: 15       Visit Number:  7 (including evaluation)  Planned total visits:    Visits Authorized/Insurance Coverage:  NO AUTH, 96% [place], 1500 OOP, [employer] MCR   01/02/2025: NO AUTH, 96[place] 1500 O[place], [employer] [facility]     Current Problem  Problem List Items Addressed This Visit           ICD-10-CM    Right ankle injury, subsequent encounter S99.911D    High ankle sprain, right, subsequent encounter S93.491D    Complete tear of ligament of ankle S93.409A    Partial tear of ligament of lateral aspect of ankle S93.499A    Peroneus brevis tendinitis, right M76.71    Peroneus longus tendinitis, right M76.71    Sprain of right ankle, subsequent encounter S93.401D    Asymptomatic varicose veins I83.90     Other Visit Diagnoses         Codes      Nondisplaced comminuted fracture of shaft of left fibula, subsequent encounter for closed fracture with routine healing     S82.455D    s/p 6 weeks tall XP walking boot and crutches             Precautions   WBAT in ankle brace.    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  Notes more right knee soreness/discomfort versus ankle pain.  Feels like he is getting better.    Objective  Decreased R TKE throughout stance with compensatory R trunk lean    Treatment:     Joint mobilizations: tibiofemoral AP, PA grade IV, patellofemoral mobs all planes  STM and IASTM with tool to right quad.       Supine:  B knee flexion green ball x15  Quad set 2 x10 5\" hold  SLR on long sitting 3x10  SAQ 3 x10 reps.    Standing TKE against ball x 20.    [employer] hamstring stretch 2 x 2 minutes each.      Current HEP:  Calf Stretch  TKE into ball  Review of previous home " program.    Has patient been consistent with performance of HEP? Yes    Assessment:  Pt noted minimal knee pain after session.  Endorses more knee pain with activities involving step up or squatting.  Areas of improvements: less severe pain over past two weeks   Limitations/deficits: gait pattern     Pain end of session: 0/10    Plan:     Continue with current POC/no changes    Assessment of current progress against goals:  Progressing toward functional goals    Goals:  SHORT TERM GOALS  1) Pt will decrease pain from 3/10 to 0/10 for improved activity tolerance  2) Pt will improve right ankle AROM to FULL to facilitate improved functional mobility without limitation    3) Pt will demonstrate x30 L SLR without quad lag to reflect improvement in quadriceps muscular endurance and increased ease with bed mobility/car transfers  4) Pt will demonstrate painfree, symmetrical sit to stand to/from chair without UE support to improve transfers  5) Pt will be independent with HEP.     LONG TERM GOALS  1) Pt will demonstrate independence with HEP for self management of condition  2) Pt will improve Right ankle DF, PF, INV, EVER and  hip flexion and knee flexion/extension strength to 5/5 for improved standing and walking tolerance  3) Pt will improve gait to independent without assistive device and normalized mechanics for improved ambulation  4) Pt will improve stair negotiation to reciprocal without UE support for improved home/community navigation     5) SLS to 10 seconds or more.

## 2025-06-27 DIAGNOSIS — I10 ESSENTIAL HYPERTENSION: ICD-10-CM

## 2025-07-01 RX ORDER — ROSUVASTATIN CALCIUM 20 MG/1
20 TABLET, COATED ORAL DAILY
Qty: 90 TABLET | Refills: 3 | Status: SHIPPED | OUTPATIENT
Start: 2025-07-01

## 2025-07-29 ENCOUNTER — OFFICE VISIT (OUTPATIENT)
Age: 70
End: 2025-07-29
Payer: MEDICARE

## 2025-07-29 VITALS
SYSTOLIC BLOOD PRESSURE: 122 MMHG | DIASTOLIC BLOOD PRESSURE: 68 MMHG | BODY MASS INDEX: 35.64 KG/M2 | WEIGHT: 308 LBS | OXYGEN SATURATION: 96 % | HEART RATE: 56 BPM | HEIGHT: 78 IN

## 2025-07-29 DIAGNOSIS — I48.19 PERSISTENT ATRIAL FIBRILLATION (MULTI): Primary | ICD-10-CM

## 2025-07-29 DIAGNOSIS — I10 ESSENTIAL HYPERTENSION: ICD-10-CM

## 2025-07-29 DIAGNOSIS — I35.0 NONRHEUMATIC AORTIC (VALVE) STENOSIS: ICD-10-CM

## 2025-07-29 PROCEDURE — 3074F SYST BP LT 130 MM HG: CPT | Performed by: INTERNAL MEDICINE

## 2025-07-29 PROCEDURE — 3078F DIAST BP <80 MM HG: CPT | Performed by: INTERNAL MEDICINE

## 2025-07-29 PROCEDURE — 3008F BODY MASS INDEX DOCD: CPT | Performed by: INTERNAL MEDICINE

## 2025-07-29 PROCEDURE — 1126F AMNT PAIN NOTED NONE PRSNT: CPT | Performed by: INTERNAL MEDICINE

## 2025-07-29 PROCEDURE — 99214 OFFICE O/P EST MOD 30 MIN: CPT | Performed by: INTERNAL MEDICINE

## 2025-07-29 PROCEDURE — 1036F TOBACCO NON-USER: CPT | Performed by: INTERNAL MEDICINE

## 2025-07-29 PROCEDURE — 1159F MED LIST DOCD IN RCRD: CPT | Performed by: INTERNAL MEDICINE

## 2025-07-29 ASSESSMENT — LIFESTYLE VARIABLES
HAS A RELATIVE, FRIEND, DOCTOR, OR ANOTHER HEALTH PROFESSIONAL EXPRESSED CONCERN ABOUT YOUR DRINKING OR SUGGESTED YOU CUT DOWN: NO
HOW OFTEN DO YOU HAVE SIX OR MORE DRINKS ON ONE OCCASION: NEVER
HOW OFTEN DURING THE LAST YEAR HAVE YOU FOUND THAT YOU WERE NOT ABLE TO STOP DRINKING ONCE YOU HAD STARTED: NEVER
AUDIT TOTAL SCORE: 2
HOW OFTEN DO YOU HAVE A DRINK CONTAINING ALCOHOL: 2-4 TIMES A MONTH
HOW OFTEN DURING THE LAST YEAR HAVE YOU FAILED TO DO WHAT WAS NORMALLY EXPECTED FROM YOU BECAUSE OF DRINKING: NEVER
AUDIT-C TOTAL SCORE: 2
HAVE YOU OR SOMEONE ELSE BEEN INJURED AS A RESULT OF YOUR DRINKING: NO
HOW OFTEN DURING THE LAST YEAR HAVE YOU HAD A FEELING OF GUILT OR REMORSE AFTER DRINKING: NEVER
HOW OFTEN DURING THE LAST YEAR HAVE YOU BEEN UNABLE TO REMEMBER WHAT HAPPENED THE NIGHT BEFORE BECAUSE YOU HAD BEEN DRINKING: NEVER
HOW OFTEN DURING THE LAST YEAR HAVE YOU NEEDED AN ALCOHOLIC DRINK FIRST THING IN THE MORNING TO GET YOURSELF GOING AFTER A NIGHT OF HEAVY DRINKING: NEVER
TOTAL SCORE: 0
SKIP TO QUESTIONS 9-10: 1
HOW MANY STANDARD DRINKS CONTAINING ALCOHOL DO YOU HAVE ON A TYPICAL DAY: 1 OR 2

## 2025-07-29 ASSESSMENT — ENCOUNTER SYMPTOMS
OCCASIONAL FEELINGS OF UNSTEADINESS: 0
LOSS OF SENSATION IN FEET: 0
DEPRESSION: 0

## 2025-07-29 ASSESSMENT — PAIN SCALES - GENERAL: PAINLEVEL_OUTOF10: 0-NO PAIN

## 2025-07-29 NOTE — PROGRESS NOTES
[facility] [place]        Subjective   No chief complaint on file.     Here for reassessment of his atrial fibrillation which is felt to be persistent and his moderate aortic valve stenosis that is not symptomatic at this time.    He has a past medical history of Acute deep vein thrombosis (DVT) of calf muscle vein of left lower extremity (Multi) (11/08/2023), HL (hearing loss), Unspecified atrial fibrillation (Multi) (03/27/2014), and Visual impairment.  He has a past surgical history that includes Vasectomy and Eye surgery.   No relevant family history has been documented for this patient.  Current Outpatient Medications   Medication Sig Dispense Refill    Eliquis 5 mg tablet Take 1 tablet (5 mg) by mouth 2 times a day. 180 tablet 3    levothyroxine (Synthroid) 137 mcg tablet Take 1 tablet (137 mcg) by mouth once daily in the morning. Take before meals. 90 tablet 3    lisinopril 10 mg tablet Take 1 tablet (10 mg) by mouth once daily. 90 tablet 3    rosuvastatin (Crestor) 20 mg tablet Take 1 tablet (20 mg) by mouth once daily. 90 tablet 3    sildenafil (Viagra) 50 mg tablet Take 1 tablet (50 mg) by mouth once daily as needed for erectile dysfunction. 12 tablet 3     No current facility-administered medications for this visit.      reports that he has never smoked. He has never been exposed to tobacco smoke. He has never used smokeless tobacco. He reports current alcohol use of about 3.0 standard drinks of alcohol per week. He reports that he does not use drugs.  Allergies:  Cat dander    ROS: See HPI  CONSTITUTIONAL: Chills- none. Fever- none. Weight change appropriate for age.  HEENT: Headache- Negative.  Change in vision- none.  Ear pain- none. Nasal congestion- none. Post-nasal drip-none.  Sore throat-none.  CARDIOLOGY: Chest pain- none.  Leg edema-trace.  Murmurs-soft systolic.  Palpitation- none.  RESPIRATORY: Denies any shortness of breath.  GI: Abdominal pain-  "none.  Change in bowel habits- none.  Constipation- none.  Diarrhea- none.  Nausea- none.  Vomiting- none.  MUSCULOSKELETAL: Joint pain- none.  Muscle aches- none.  DERMATOLOGY: Rash- none.  NEUROLOGY: Dizziness- none.   Headache- none.  PSYCHIATRY: Denies any depression or anxiety     There were no vitals filed for this visit.   BMI:There is no height or weight on file to calculate BMI.   General Cardiology:  General Appearance: Alert, oriented and in no acute distress.  HEENT: extra ocular movements intact (EOMI), pupils equal,  round, reactive to light and accommodation (PERRLA).  Carotid Upstroke: no bruit, normal.  Jugular Venous Distention (JVD): flat.  Chest: normal.  Lungs: Clear to auscultation,   Heart Sounds: no S3 or S4, normal S1, S2, regular rate.  Murmur, Click, Gallop: soft systolic murmur.  Abdomen: no hepatomegaly, no masses felt, soft.  Extremities: no leg edema.  Peripheral pulses: 2 plus bilateral.  NEUROLOGY Cranial nerves II-XII grossly intact.     Last Labs:  CMP:  Recent Labs     11/15/24  0712 11/10/23  0812 08/10/22  1153    143 142   K 4.3 4.4 4.4   * 107 107   CO2 24 30 24   ANIONGAP 13 10 12   BUN 18 14 17   CREATININE 1.08 1.10 1.1   EGFR 75 74 74   GLUCOSE 116* 100* 97     Recent Labs     11/15/24  0712 11/10/23  0812 08/10/22  1153   ALBUMIN 4.1 4.1 4.1   ALKPHOS 63 77 99   ALT 17 11 15   AST 14 12 18   BILITOT 0.6 0.8 0.5     CBC:  Recent Labs     08/10/22  1153 06/17/22  0015 11/03/21  0459   WBC 6.1 8.7 11.0   HGB 13.5 13.4* 11.5*   HCT 40.6* 40.1* 34.8*    182 171   MCV 93.1 92.6 96.9     COAG: No results for input(s): \"INR\", \"DDIMERVTE\" in the last 05039 hours.  HEME/ENDO:  Recent Labs     11/15/24  0712 11/10/23  0812 09/15/23  1041 08/11/23  0815 06/24/22  0921   TSH 0.59 0.63 0.18*   < > 0.53   HGBA1C 6.3* 5.8*  --   --  5.7*    < > = values in this interval not displayed.      CARDIAC: No results for input(s): \"LDH\", \"CKMB\", \"TROPHS\", \"BNP\" in the last " "54586 hours.    No lab exists for component: \"CK\", \"CKMBP\"  Recent Labs     11/15/24  0712 08/10/22  1153   CHOL 83 85*   LDLCALC 31 33*   HDL 33.4 38*   TRIG 94 72       Last Cardiology Tests:  Echo:  Echo Results:  Transthoracic Echo (TTE) Complete With Contrast 03/13/2024    [facility], [address], [place] [address]  Tel [phone] and Fax [phone]    TRANSTHORACIC ECHOCARDIOGRAM REPORT      Patient Name:      LYNNE Pedraza Physician:    [id number] Ok Mullins MD  Study Date:        3/13/2024            Ordering Provider:    [id number] LENORA HOGAN  MRN/PID:           [id number]             Fellow:  Accession#:        [id number]         Nurse:                Sweta Hall  MSN, RN, CNOR,  [facility]  Date of Birth/Age: 1955 / 68      Sonographer:          Stiven villarreal RDCS  Gender:            M                    Additional Staff:  Height:            200.66 cm            Admit Date:  Weight:            138.35 kg            Admission Status:     Outpatient  BSA / BMI:         2.73 m2 / 34.36      Encounter#:           [id number]  kg/m2  Department Location:  [facility]  Blood Pressure: 132 /76 mmHg    Study Type:    TRANSTHORACIC ECHO (TTE) COMPLETE  Diagnosis/ICD: Nonrheumatic aortic (valve) stenosis-I35.0  Indication:    AS  CPT Code:      Echo Complete w Full Doppler-[id number]    Patient History:  Pertinent History: HTN, pAfib, hypothyroidism.    Study Detail: The following Echo studies were performed: 2D, M-Mode, Doppler and  color flow. Technically challenging study due to body habitus,  poor acoustic windows and prominent lung artifact. Definity used  as a contrast agent for endocardial border definition. Total  contrast used for this procedure was 2.0 mL via IV push.      PHYSICIAN INTERPRETATION:  Left Ventricle: The left ventricular systolic function is normal, with an estimated ejection fraction of 65%. " The patient is in atrial fibrillation which may influence the estimate of left ventricular function and transvalvular flows. There are no regional wall motion abnormalities. The left ventricular cavity size is normal. There is mild concentric left ventricular hypertrophy. Left ventricular diastolic filling was not assessed.  Left Atrium: The left atrium is mild to moderately dilated.  Right Ventricle: The right ventricle is normal in size. There is normal right ventriclar wall thickness. There is normal right ventricular global systolic function.  Right Atrium: The right atrium is normal in size.  Aortic Valve: The aortic valve is trileaflet. The aortic valve appears tricuspid with restriction. There is moderate aortic valve cusp calcification. There is There is reduced systolic aortic valve leaflet excursion. There is evidence of moderate aortic valve stenosis.  There is mild aortic valve regurgitation. The peak instantaneous gradient of the aortic valve is 44.6 mmHg. The mean gradient of the aortic valve is 27.5 mmHg.  Mitral Valve: The mitral valve is normal in structure. There is normal mitral valve leaflet mobility. There is mild mitral annular calcification. There is mild mitral valve regurgitation.  Tricuspid Valve: The tricuspid valve is structurally normal. There is normal tricuspid valve leaflet mobility. There is trace to mild tricuspid regurgitation.  Pulmonic Valve: The pulmonic valve is structurally normal. There is trace pulmonic valve regurgitation.  Pericardium: There is no pericardial effusion noted.  Aorta: The aortic root is abnormal. There is upper limits of normal dilatation of the ascending aorta. The aortic root is at the upper limits of normal size. There is plaque visualized in the ascending aorta, which is classified as a Grade 2 [mild (focal or diffuse) intimal thickening of 2-3 mm] atherosclerosis.  Pulmonary Artery: The pulmonary artery is normal in size. The tricuspid regurgitant  velocity is 1.82 m/s, and with an estimated right atrial pressure of 15 mmHg, the estimated pulmonary artery pressure is normal with the RVSP at 28.2 mmHg.  Systemic Veins: The inferior vena cava appears severely dilated.      CONCLUSIONS:  1. Left ventricular systolic function is normal with a 65% estimated ejection fraction.  2. The left atrium is mild to moderately dilated.  3. Mild mitral valve regurgitation.  4. Trace to mild tricuspid regurgitation visualized.  5. Moderate aortic valve stenosis.  6. The aortic valve appears tricuspid with restriction.  7. There is moderate aortic valve cusp calcification.  8. Mild aortic valve regurgitation.  9. The inferior vena cava appears severely dilated.  10. The patient is in atrial fibrillation which may influence the estimate of left ventricular function and transvalvular flows.  11. The peak instantaneous gradient of the aortic valve is 44.6 mmHg.  12. There is plaque visualized in the ascending aorta.    QUANTITATIVE DATA SUMMARY:  2D MEASUREMENTS:  Normal Ranges:  Ao Root d:     3.80 cm   (2.0-3.7cm)  LAs:           4.49 cm   (2.7-4.0cm)  IVSd:          1.07 cm   (0.6-1.1cm)  LVPWd:         0.99 cm   (0.6-1.1cm)  LVIDd:         5.25 cm   (3.9-5.9cm)  LVIDs:         3.64 cm  LV Mass Index: 75.4 g/m2  LV % FS        30.5 %    LA VOLUME:  Normal Ranges:  LA Vol A4C:        99.9 ml    (22+/-6mL/m2)  LA Vol A2C:        85.2 ml  LA Vol BP:         96.8 ml  LA Vol Index A4C:  36.6ml/m2  LA Vol Index A2C:  31.3 ml/m2  LA Vol Index BP:   35.5 ml/m2  LA Area A4C:       29.9 cm2  LA Area A2C:       26.3 cm2  LA Major Axis A4C: 7.6 cm  LA Major Axis A2C: 6.9 cm  LA Volume Index:   35.5 ml/m2  LA Vol A4C:        84.0 ml  LA Vol A2C:        81.2 ml    RA VOLUME BY A/L METHOD:  Normal Ranges:  RA Area A4C: 22.2 cm2    AORTA MEASUREMENTS:  Normal Ranges:  Asc Ao, d: 4.00 cm (2.1-3.4cm)    LV SYSTOLIC FUNCTION BY 2D PLANIMETRY (MOD):  Normal Ranges:  EF-A4C View: 58.1 %  (>=55%)  EF-A2C View: 77.1 %  EF-Biplane:  73.0 %    LV DIASTOLIC FUNCTION:  Normal Ranges:  MV Peak E: 0.96 m/s (0.7-1.2 m/s)    AORTIC VALVE:  Normal Ranges:  AoV Vmax:                3.34 m/s  (<=1.7m/s)  AoV Peak P.6 mmHg (<20mmHg)  AoV Mean P.5 mmHg (1.7-11.5mmHg)  LVOT Max Ramu:            1.05 m/s  (<=1.1m/s)  AoV VTI:                 73.90 cm  (18-25cm)  LVOT VTI:                24.27 cm  LVOT Diameter:           2.11 cm   (1.8-2.4cm)  AoV Area, VTI:           1.15 cm2  (2.5-5.5cm2)  AoV Area,Vmax:           1.10 cm2  (2.5-4.5cm2)  AoV Dimensionless Index: 0.33      RIGHT VENTRICLE:  RV Basal 4.60 cm  RV Mid   3.10 cm  TAPSE:   19.0 mm  RV s'    0.11 m/s    TRICUSPID VALVE/RVSP:  Normal Ranges:  Peak TR Velocity: 1.82 m/s  RV Syst Pressure: 28.2 mmHg (< 30mmHg)  IVC Diam:         3.00 cm    PULMONIC VALVE:  Normal Ranges:  PV Accel Time: 103 msec (>120ms)  PV Max Ramu:    0.8 m/s  (0.6-0.9m/s)  PV Max P.3 mmHg    AORTA:  Asc Ao Diam 3.98 cm      [id number] Ok Mullins MD  Electronically signed on 3/15/2024 at 5:21:36 PM        ** Final **     Cath:  Stress Test:  Stress Results:  No results found for this or any previous visit from the past 365 days.     Cardiac Imaging:    Problem List Items Addressed This Visit       Nonrheumatic aortic (valve) stenosis    Nonrheumatic aortic (valve) stenosis  Echocardiogram in 2024 in the setting of normal LV systolic function and aortic valve area 1.15 cm².  Tolerating with a normal functional capacity well with no signs of volume overload.            Persistent atrial fibrillation (Multi) - Primary      EKG  2025 confirms atrial fibrillation with good rate control and nonspecific ST-T changes suggesting that his atrial fibrillation is asymptomatic and persistent/chronic.  Continue rate control and anticoagulation              Suggest echocardiography in 6 months to reassess his aortic valve disease and left and right heart  function.    Continue commitment to the low carbohydrate low sugar Mediterranean type dietary lifestyle and daily walking/exercise that is mild to modest in the setting of his aortic valve stenosis.        Pt. care time is spent includes review of diagnostic tests, labs, radiographs, EKGs, old echoes, cardiac work-up and coordination of care. Assessment, impression and plans are reflected in the note above as well as the orders.    Humberto Alvarez,   [facility] [place]  [facility]

## 2025-07-29 NOTE — PROGRESS NOTES
[facility] [place]        Subjective   Chief Complaint   Patient presents with    Follow-up     6 months      Here to reassess aortic valve stenosis and hypertension.  Angina free with no signs of heart failure    He has a past medical history of Acute deep vein thrombosis (DVT) of calf muscle vein of left lower extremity (11/08/2023), HL (hearing loss), Unspecified atrial fibrillation (Multi) (03/27/2014), and Visual impairment.  He has a past surgical history that includes Vasectomy and Eye surgery.   No relevant family history has been documented for this patient.  Current Outpatient Medications   Medication Sig Dispense Refill    Eliquis 5 mg tablet Take 1 tablet (5 mg) by mouth 2 times a day. 180 tablet 3    levothyroxine (Synthroid) 137 mcg tablet Take 1 tablet (137 mcg) by mouth once daily in the morning. Take before meals. 90 tablet 3    lisinopril 10 mg tablet Take 1 tablet (10 mg) by mouth once daily. 90 tablet 3    rosuvastatin (Crestor) 20 mg tablet Take 1 tablet (20 mg) by mouth once daily. 90 tablet 3    sildenafil (Viagra) 50 mg tablet Take 1 tablet (50 mg) by mouth once daily as needed for erectile dysfunction. 12 tablet 3     No current facility-administered medications for this visit.      reports that he has never smoked. He has never been exposed to tobacco smoke. He has never used smokeless tobacco. He reports current alcohol use of about 1.0 - 2.0 standard drink of alcohol per week. He reports that he does not use drugs.  Allergies:  Cat dander    ROS: See HPI  CONSTITUTIONAL: Chills- none. Fever- none. Weight change appropriate for age.  HEENT: Headache- Negative.  Change in vision- none.  Ear pain- none. Nasal congestion- none. Post-nasal drip-none.  Sore throat-none.  CARDIOLOGY: Chest pain- none.  Leg edema-trace.  Murmurs-soft systolic.  Palpitation- none.  RESPIRATORY: Denies any shortness of breath.  GI: Abdominal pain- none.  Change in bowel  "habits- none.  Constipation- none.  Diarrhea- none.  Nausea- none.  Vomiting- none.  MUSCULOSKELETAL: Joint pain- none.  Muscle aches- none.  DERMATOLOGY: Rash- none.  NEUROLOGY: Dizziness- none.   Headache- none.  PSYCHIATRY: Denies any depression or anxiety     Vitals:    07/29/25 1326   BP: 122/68   Pulse: 56   SpO2: 96%   Weight: 140 kg (308 lb)   Height: (!) 1.981 m (6' 6\")   PainSc: 0-No pain      BMI:Body mass index is 35.59 kg/m².   General Cardiology:  General Appearance: Alert, oriented and in no acute distress.  HEENT: extra ocular movements intact (EOMI), pupils equal,  round, reactive to light and accommodation (PERRLA).  Carotid Upstroke: no bruit, normal.  Jugular Venous Distention (JVD): flat.  Chest: normal.  Lungs: Clear to auscultation,   Heart Sounds: no S3 or S4, normal S1, S2, regular rate.  Murmur, Click, Gallop: soft systolic murmur.  Abdomen: no hepatomegaly, no masses felt, soft.  Extremities: no leg edema.  Peripheral pulses: 2 plus bilateral.  NEUROLOGY Cranial nerves II-XII grossly intact.     Last Labs:  CMP:  Recent Labs     11/15/24  0712 11/10/23  0812 08/10/22  1153    143 142   K 4.3 4.4 4.4   * 107 107   CO2 24 30 24   ANIONGAP 13 10 12   BUN 18 14 17   CREATININE 1.08 1.10 1.1   EGFR 75 74 74   GLUCOSE 116* 100* 97     Recent Labs     11/15/24  0712 11/10/23  0812 08/10/22  1153   ALBUMIN 4.1 4.1 4.1   ALKPHOS 63 77 99   ALT 17 11 15   AST 14 12 18   BILITOT 0.6 0.8 0.5     CBC:  Recent Labs     08/10/22  1153 06/17/22  0015 11/03/21  0459   WBC 6.1 8.7 11.0   HGB 13.5 13.4* 11.5*   HCT 40.6* 40.1* 34.8*    182 171   MCV 93.1 92.6 96.9     COAG: No results for input(s): \"INR\", \"DDIMERVTE\" in the last 84064 hours.  HEME/ENDO:  Recent Labs     11/15/24  0712 11/10/23  0812 09/15/23  1041 08/11/23  0815 06/24/22  0921   TSH 0.59 0.63 0.18*   < > 0.53   HGBA1C 6.3* 5.8*  --   --  5.7*    < > = values in this interval not displayed.      CARDIAC: No results for " "input(s): \"LDH\", \"CKMB\", \"TROPHS\", \"BNP\" in the last 41744 hours.    No lab exists for component: \"CK\", \"CKMBP\"  Recent Labs     11/15/24  0712 08/10/22  1153   CHOL 83 85*   LDLCALC 31 33*   HDL 33.4 38*   TRIG 94 72       Last Cardiology Tests:  Echo:  Echo Results:  Transthoracic Echo (TTE) Complete With Contrast 03/13/2024    [place] [id number]  Tel [phone] and Fax [phone]    TRANSTHORACIC ECHOCARDIOGRAM REPORT      Patient Name:      LYNNE Pedraza Physician:    [id number] Ok Mullins MD  Study Date:        3/13/2024            Ordering Provider:    [id number] LENORA HOGAN  MRN/PID:           [id number]             Fellow:  Accession#:        [id number]         Nurse:                Sweta Hall  MSN, RN, CNOR,  [facility]  Date of Birth/Age: 1955 / 68      Sonographer:          Stiven villarreal                                      [place]  Gender:            M                    Additional Staff:  Height:            200.66 cm            Admit Date:  Weight:            138.35 kg            Admission Status:     Outpatient  BSA / BMI:         2.73 m2 / 34.36      Encounter#:           [id number]  kg/m2  Department Location:  [facility]  Blood Pressure: 132 /76 mmHg    Study Type:    TRANSTHORACIC ECHO (TTE) COMPLETE  Diagnosis/ICD: Nonrheumatic aortic (valve) stenosis-I35.0  Indication:    AS  CPT Code:      Echo Complete w Full Doppler-39143    Patient History:  Pertinent History: HTN, pAfib, hypothyroidism.    Study Detail: The following Echo studies were performed: 2D, M-Mode, Doppler and  color flow. Technically challenging study due to body habitus,  poor acoustic windows and prominent lung artifact. Definity used  as a contrast agent for endocardial border definition. Total  contrast used for this procedure was 2.0 mL via IV push.      PHYSICIAN INTERPRETATION:  Left Ventricle: The left ventricular systolic function is " normal, with an estimated ejection fraction of 65%. The patient is in atrial fibrillation which may influence the estimate of left ventricular function and transvalvular flows. There are no regional wall motion abnormalities. The left ventricular cavity size is normal. There is mild concentric left ventricular hypertrophy. Left ventricular diastolic filling was not assessed.  Left Atrium: The left atrium is mild to moderately dilated.  Right Ventricle: The right ventricle is normal in size. There is normal right ventriclar wall thickness. There is normal right ventricular global systolic function.  Right Atrium: The right atrium is normal in size.  Aortic Valve: The aortic valve is trileaflet. The aortic valve appears tricuspid with restriction. There is moderate aortic valve cusp calcification. There is There is reduced systolic aortic valve leaflet excursion. There is evidence of moderate aortic valve stenosis.  There is mild aortic valve regurgitation. The peak instantaneous gradient of the aortic valve is 44.6 mmHg. The mean gradient of the aortic valve is 27.5 mmHg.  Mitral Valve: The mitral valve is normal in structure. There is normal mitral valve leaflet mobility. There is mild mitral annular calcification. There is mild mitral valve regurgitation.  Tricuspid Valve: The tricuspid valve is structurally normal. There is normal tricuspid valve leaflet mobility. There is trace to mild tricuspid regurgitation.  Pulmonic Valve: The pulmonic valve is structurally normal. There is trace pulmonic valve regurgitation.  Pericardium: There is no pericardial effusion noted.  Aorta: The aortic root is abnormal. There is upper limits of normal dilatation of the ascending aorta. The aortic root is at the upper limits of normal size. There is plaque visualized in the ascending aorta, which is classified as a Grade 2 [mild (focal or diffuse) intimal thickening of 2-3 mm] atherosclerosis.  Pulmonary Artery: The pulmonary  artery is normal in size. The tricuspid regurgitant velocity is 1.82 m/s, and with an estimated right atrial pressure of 15 mmHg, the estimated pulmonary artery pressure is normal with the RVSP at 28.2 mmHg.  Systemic Veins: The inferior vena cava appears severely dilated.      CONCLUSIONS:  1. Left ventricular systolic function is normal with a 65% estimated ejection fraction.  2. The left atrium is mild to moderately dilated.  3. Mild mitral valve regurgitation.  4. Trace to mild tricuspid regurgitation visualized.  5. Moderate aortic valve stenosis.  6. The aortic valve appears tricuspid with restriction.  7. There is moderate aortic valve cusp calcification.  8. Mild aortic valve regurgitation.  9. The inferior vena cava appears severely dilated.  10. The patient is in atrial fibrillation which may influence the estimate of left ventricular function and transvalvular flows.  11. The peak instantaneous gradient of the aortic valve is 44.6 mmHg.  12. There is plaque visualized in the ascending aorta.    QUANTITATIVE DATA SUMMARY:  2D MEASUREMENTS:  Normal Ranges:  Ao Root d:     3.80 cm   (2.0-3.7cm)  LAs:           4.49 cm   (2.7-4.0cm)  IVSd:          1.07 cm   (0.6-1.1cm)  LVPWd:         0.99 cm   (0.6-1.1cm)  LVIDd:         5.25 cm   (3.9-5.9cm)  LVIDs:         3.64 cm  LV Mass Index: 75.4 g/m2  LV % FS        30.5 %    LA VOLUME:  Normal Ranges:  LA Vol A4C:        99.9 ml    (22+/-6mL/m2)  LA Vol A2C:        85.2 ml  LA Vol BP:         96.8 ml  LA Vol Index A4C:  36.6ml/m2  LA Vol Index A2C:  31.3 ml/m2  LA Vol Index BP:   35.5 ml/m2  LA Area A4C:       29.9 cm2  LA Area A2C:       26.3 cm2  LA Major Axis A4C: 7.6 cm  LA Major Axis A2C: 6.9 cm  LA Volume Index:   35.5 ml/m2  LA Vol A4C:        84.0 ml  LA Vol A2C:        81.2 ml    RA VOLUME BY A/L METHOD:  Normal Ranges:  RA Area A4C: 22.2 cm2    AORTA MEASUREMENTS:  Normal Ranges:  Asc Ao, d: 4.00 cm (2.1-3.4cm)    LV SYSTOLIC FUNCTION BY 2D PLANIMETRY  (MOD):  Normal Ranges:  EF-A4C View: 58.1 % (>=55%)  EF-A2C View: 77.1 %  EF-Biplane:  73.0 %    LV DIASTOLIC FUNCTION:  Normal Ranges:  MV Peak E: 0.96 m/s (0.7-1.2 m/s)    AORTIC VALVE:  Normal Ranges:  AoV Vmax:                3.34 m/s  (<=1.7m/s)  AoV Peak P.6 mmHg (<20mmHg)  AoV Mean P.5 mmHg (1.7-11.5mmHg)  LVOT Max Ramu:            1.05 m/s  (<=1.1m/s)  AoV VTI:                 73.90 cm  (18-25cm)  LVOT VTI:                24.27 cm  LVOT Diameter:           2.11 cm   (1.8-2.4cm)  AoV Area, VTI:           1.15 cm2  (2.5-5.5cm2)  AoV Area,Vmax:           1.10 cm2  (2.5-4.5cm2)  AoV Dimensionless Index: 0.33      RIGHT VENTRICLE:  RV Basal 4.60 cm  RV Mid   3.10 cm  TAPSE:   19.0 mm  RV s'    0.11 m/s    TRICUSPID VALVE/RVSP:  Normal Ranges:  Peak TR Velocity: 1.82 m/s  RV Syst Pressure: 28.2 mmHg (< 30mmHg)  IVC Diam:         3.00 cm    PULMONIC VALVE:  Normal Ranges:  PV Accel Time: 103 msec (>120ms)  PV Max Ramu:    0.8 m/s  (0.6-0.9m/s)  PV Max P.3 mmHg    AORTA:  Asc Ao Diam 3.98 cm      [id number] Ok Mullins MD  Electronically signed on 3/15/2024 at 5:21:36 PM        ** Final **     Cath:  Stress Test:  Stress Results:  No results found for this or any previous visit from the past 365 days.     Cardiac Imaging:    Problem List Items Addressed This Visit       Essential hypertension    Excellent blood pressure on low-dose lisinopril         Nonrheumatic aortic (valve) stenosis    Nonrheumatic aortic (valve) stenosis  Echocardiogram in 2024 in the setting of normal LV systolic function and aortic valve area 1.15 cm².  Tolerating with a normal functional capacity well with no signs of volume overload.            Persistent atrial fibrillation (Multi) - Primary      EKG  2025 confirms atrial fibrillation with good rate control and nonspecific ST-T changes suggesting that his atrial fibrillation is asymptomatic and persistent/chronic.  Continue rate control and  anticoagulation              Will follow-up with me in January 2026 to review echo results.        Pt. care time is spent includes review of diagnostic tests, labs, radiographs, EKGs, old echoes, cardiac work-up and coordination of care. Assessment, impression and plans are reflected in the note above as well as the orders.    Humberto Alvarez DO  [facility] [place]  [facility]

## 2025-07-29 NOTE — ASSESSMENT & PLAN NOTE
EKG  1/23/2025 confirms atrial fibrillation with good rate control and nonspecific ST-T changes suggesting that his atrial fibrillation is asymptomatic and persistent/chronic.  Continue rate control and anticoagulation

## 2025-07-29 NOTE — ASSESSMENT & PLAN NOTE
Nonrheumatic aortic (valve) stenosis  Echocardiogram in March 2024 in the setting of normal LV systolic function and aortic valve area 1.15 cm².  Tolerating with a normal functional capacity well with no signs of volume overload.

## 2025-07-31 ENCOUNTER — APPOINTMENT (OUTPATIENT)
Dept: CARDIOLOGY | Facility: CLINIC | Age: 70
End: 2025-07-31
Payer: MEDICARE